# Patient Record
Sex: MALE | Race: WHITE | Employment: FULL TIME | URBAN - METROPOLITAN AREA
[De-identification: names, ages, dates, MRNs, and addresses within clinical notes are randomized per-mention and may not be internally consistent; named-entity substitution may affect disease eponyms.]

---

## 2020-10-21 ENCOUNTER — HOSPITAL ENCOUNTER (EMERGENCY)
Age: 50
Discharge: LEFT BEFORE DISCHARGE | End: 2020-10-21
Attending: FAMILY MEDICINE
Payer: COMMERCIAL

## 2020-10-21 ENCOUNTER — APPOINTMENT (OUTPATIENT)
Dept: CT IMAGING | Age: 50
End: 2020-10-21
Payer: COMMERCIAL

## 2020-10-21 ENCOUNTER — HOSPITAL ENCOUNTER (INPATIENT)
Age: 50
LOS: 5 days | Discharge: HOME | DRG: 385 | End: 2020-10-26
Attending: EMERGENCY MEDICINE | Admitting: SURGERY
Payer: COMMERCIAL

## 2020-10-21 VITALS
SYSTOLIC BLOOD PRESSURE: 120 MMHG | HEIGHT: 72 IN | HEART RATE: 80 BPM | DIASTOLIC BLOOD PRESSURE: 73 MMHG | RESPIRATION RATE: 18 BRPM | WEIGHT: 148 LBS | BODY MASS INDEX: 20.05 KG/M2 | OXYGEN SATURATION: 99 % | TEMPERATURE: 97.7 F

## 2020-10-21 PROBLEM — K65.1 INTRA-ABDOMINAL ABSCESS (HCC): Status: ACTIVE | Noted: 2020-10-21

## 2020-10-21 LAB
ALBUMIN SERPL-MCNC: 2.9 G/DL (ref 3.5–5.2)
ALBUMIN SERPL-MCNC: 3 G/DL (ref 3.5–5.2)
ALP BLD-CCNC: 75 U/L (ref 40–129)
ALP BLD-CCNC: 75 U/L (ref 40–129)
ALT SERPL-CCNC: 10 U/L (ref 0–40)
ALT SERPL-CCNC: 11 U/L (ref 0–40)
AMYLASE: 24 U/L (ref 20–100)
ANION GAP SERPL CALCULATED.3IONS-SCNC: 14 MMOL/L (ref 7–16)
ANION GAP SERPL CALCULATED.3IONS-SCNC: 9 MMOL/L (ref 7–16)
AST SERPL-CCNC: 12 U/L (ref 0–39)
AST SERPL-CCNC: 12 U/L (ref 0–39)
BACTERIA: ABNORMAL /HPF
BASOPHILS ABSOLUTE: 0.03 E9/L (ref 0–0.2)
BASOPHILS ABSOLUTE: 0.04 E9/L (ref 0–0.2)
BASOPHILS RELATIVE PERCENT: 0.3 % (ref 0–2)
BASOPHILS RELATIVE PERCENT: 0.4 % (ref 0–2)
BILIRUB SERPL-MCNC: 0.4 MG/DL (ref 0–1.2)
BILIRUB SERPL-MCNC: 0.5 MG/DL (ref 0–1.2)
BILIRUBIN URINE: NEGATIVE
BILIRUBIN URINE: NEGATIVE
BLOOD, URINE: NEGATIVE
BLOOD, URINE: NEGATIVE
BUN BLDV-MCNC: 12 MG/DL (ref 6–20)
BUN BLDV-MCNC: 8 MG/DL (ref 6–20)
CALCIUM SERPL-MCNC: 9 MG/DL (ref 8.6–10.2)
CALCIUM SERPL-MCNC: 9 MG/DL (ref 8.6–10.2)
CHLORIDE BLD-SCNC: 96 MMOL/L (ref 98–107)
CHLORIDE BLD-SCNC: 98 MMOL/L (ref 98–107)
CLARITY: CLEAR
CLARITY: CLEAR
CO2: 24 MMOL/L (ref 22–29)
CO2: 25 MMOL/L (ref 22–29)
COLOR: YELLOW
COLOR: YELLOW
CREAT SERPL-MCNC: 0.6 MG/DL (ref 0.7–1.2)
CREAT SERPL-MCNC: 0.8 MG/DL (ref 0.7–1.2)
EOSINOPHILS ABSOLUTE: 0.02 E9/L (ref 0.05–0.5)
EOSINOPHILS ABSOLUTE: 0.1 E9/L (ref 0.05–0.5)
EOSINOPHILS RELATIVE PERCENT: 0.2 % (ref 0–6)
EOSINOPHILS RELATIVE PERCENT: 1 % (ref 0–6)
GFR AFRICAN AMERICAN: >60
GFR AFRICAN AMERICAN: >60
GFR NON-AFRICAN AMERICAN: >60 ML/MIN/1.73
GFR NON-AFRICAN AMERICAN: >60 ML/MIN/1.73
GLUCOSE BLD-MCNC: 107 MG/DL (ref 74–99)
GLUCOSE BLD-MCNC: 121 MG/DL (ref 74–99)
GLUCOSE URINE: NEGATIVE MG/DL
GLUCOSE URINE: NEGATIVE MG/DL
HCT VFR BLD CALC: 28.8 % (ref 37–54)
HCT VFR BLD CALC: 29.5 % (ref 37–54)
HEMOGLOBIN: 9.2 G/DL (ref 12.5–16.5)
HEMOGLOBIN: 9.2 G/DL (ref 12.5–16.5)
IMMATURE GRANULOCYTES #: 0.05 E9/L
IMMATURE GRANULOCYTES #: 0.07 E9/L
IMMATURE GRANULOCYTES %: 0.5 % (ref 0–5)
IMMATURE GRANULOCYTES %: 0.6 % (ref 0–5)
KETONES, URINE: ABNORMAL MG/DL
KETONES, URINE: NEGATIVE MG/DL
LACTIC ACID: 1.7 MMOL/L (ref 0.5–2.2)
LACTIC ACID: 2 MMOL/L (ref 0.5–2.2)
LEUKOCYTE ESTERASE, URINE: NEGATIVE
LEUKOCYTE ESTERASE, URINE: NEGATIVE
LIPASE: 10 U/L (ref 13–60)
LIPASE: 11 U/L (ref 13–60)
LYMPHOCYTES ABSOLUTE: 0.92 E9/L (ref 1.5–4)
LYMPHOCYTES ABSOLUTE: 1.59 E9/L (ref 1.5–4)
LYMPHOCYTES RELATIVE PERCENT: 16.7 % (ref 20–42)
LYMPHOCYTES RELATIVE PERCENT: 8.2 % (ref 20–42)
MCH RBC QN AUTO: 26 PG (ref 26–35)
MCH RBC QN AUTO: 26.5 PG (ref 26–35)
MCHC RBC AUTO-ENTMCNC: 31.2 % (ref 32–34.5)
MCHC RBC AUTO-ENTMCNC: 31.9 % (ref 32–34.5)
MCV RBC AUTO: 83 FL (ref 80–99.9)
MCV RBC AUTO: 83.3 FL (ref 80–99.9)
MONOCYTES ABSOLUTE: 0.47 E9/L (ref 0.1–0.95)
MONOCYTES ABSOLUTE: 0.63 E9/L (ref 0.1–0.95)
MONOCYTES RELATIVE PERCENT: 4.2 % (ref 2–12)
MONOCYTES RELATIVE PERCENT: 6.6 % (ref 2–12)
NEUTROPHILS ABSOLUTE: 7.13 E9/L (ref 1.8–7.3)
NEUTROPHILS ABSOLUTE: 9.64 E9/L (ref 1.8–7.3)
NEUTROPHILS RELATIVE PERCENT: 74.9 % (ref 43–80)
NEUTROPHILS RELATIVE PERCENT: 86.4 % (ref 43–80)
NITRITE, URINE: NEGATIVE
NITRITE, URINE: NEGATIVE
PDW BLD-RTO: 17.9 FL (ref 11.5–15)
PDW BLD-RTO: 17.9 FL (ref 11.5–15)
PH UA: 5.5 (ref 5–9)
PH UA: 6 (ref 5–9)
PLATELET # BLD: 664 E9/L (ref 130–450)
PLATELET # BLD: 666 E9/L (ref 130–450)
PMV BLD AUTO: 9.1 FL (ref 7–12)
PMV BLD AUTO: 9.1 FL (ref 7–12)
POTASSIUM REFLEX MAGNESIUM: 4.3 MMOL/L (ref 3.5–5)
POTASSIUM SERPL-SCNC: 4.3 MMOL/L (ref 3.5–5)
PROTEIN UA: NEGATIVE MG/DL
PROTEIN UA: NEGATIVE MG/DL
RBC # BLD: 3.47 E12/L (ref 3.8–5.8)
RBC # BLD: 3.54 E12/L (ref 3.8–5.8)
RBC UA: ABNORMAL /HPF (ref 0–2)
SODIUM BLD-SCNC: 132 MMOL/L (ref 132–146)
SODIUM BLD-SCNC: 134 MMOL/L (ref 132–146)
SPECIFIC GRAVITY UA: 1.01 (ref 1–1.03)
SPECIFIC GRAVITY UA: 1.01 (ref 1–1.03)
TOTAL PROTEIN: 6.6 G/DL (ref 6.4–8.3)
TOTAL PROTEIN: 7 G/DL (ref 6.4–8.3)
TSH SERPL DL<=0.05 MIU/L-ACNC: 1.6 UIU/ML (ref 0.27–4.2)
UROBILINOGEN, URINE: 2 E.U./DL
UROBILINOGEN, URINE: 2 E.U./DL
WBC # BLD: 11.2 E9/L (ref 4.5–11.5)
WBC # BLD: 9.5 E9/L (ref 4.5–11.5)
WBC UA: ABNORMAL /HPF (ref 0–5)

## 2020-10-21 PROCEDURE — 74177 CT ABD & PELVIS W/CONTRAST: CPT

## 2020-10-21 PROCEDURE — 96365 THER/PROPH/DIAG IV INF INIT: CPT

## 2020-10-21 PROCEDURE — 85025 COMPLETE CBC W/AUTO DIFF WBC: CPT

## 2020-10-21 PROCEDURE — 81001 URINALYSIS AUTO W/SCOPE: CPT

## 2020-10-21 PROCEDURE — 1200000000 HC SEMI PRIVATE

## 2020-10-21 PROCEDURE — 2580000003 HC RX 258: Performed by: RADIOLOGY

## 2020-10-21 PROCEDURE — 82150 ASSAY OF AMYLASE: CPT

## 2020-10-21 PROCEDURE — 83690 ASSAY OF LIPASE: CPT

## 2020-10-21 PROCEDURE — 6360000004 HC RX CONTRAST MEDICATION: Performed by: RADIOLOGY

## 2020-10-21 PROCEDURE — 99282 EMERGENCY DEPT VISIT SF MDM: CPT

## 2020-10-21 PROCEDURE — 84443 ASSAY THYROID STIM HORMONE: CPT

## 2020-10-21 PROCEDURE — 99284 EMERGENCY DEPT VISIT MOD MDM: CPT

## 2020-10-21 PROCEDURE — 83605 ASSAY OF LACTIC ACID: CPT

## 2020-10-21 PROCEDURE — 80053 COMPREHEN METABOLIC PANEL: CPT

## 2020-10-21 PROCEDURE — 81003 URINALYSIS AUTO W/O SCOPE: CPT

## 2020-10-21 PROCEDURE — 6360000002 HC RX W HCPCS: Performed by: FAMILY MEDICINE

## 2020-10-21 PROCEDURE — 2580000003 HC RX 258: Performed by: SURGERY

## 2020-10-21 PROCEDURE — 36415 COLL VENOUS BLD VENIPUNCTURE: CPT

## 2020-10-21 PROCEDURE — 2580000003 HC RX 258: Performed by: FAMILY MEDICINE

## 2020-10-21 RX ORDER — BUPRENORPHINE AND NALOXONE 8; 2 MG/1; MG/1
1 FILM, SOLUBLE BUCCAL; SUBLINGUAL DAILY
COMMUNITY

## 2020-10-21 RX ORDER — SODIUM CHLORIDE 0.9 % (FLUSH) 0.9 %
10 SYRINGE (ML) INJECTION PRN
Status: COMPLETED | OUTPATIENT
Start: 2020-10-21 | End: 2020-10-21

## 2020-10-21 RX ORDER — 0.9 % SODIUM CHLORIDE 0.9 %
1000 INTRAVENOUS SOLUTION INTRAVENOUS ONCE
Status: COMPLETED | OUTPATIENT
Start: 2020-10-21 | End: 2020-10-21

## 2020-10-21 RX ORDER — MAGNESIUM SULFATE IN WATER 40 MG/ML
2 INJECTION, SOLUTION INTRAVENOUS PRN
Status: DISCONTINUED | OUTPATIENT
Start: 2020-10-21 | End: 2020-10-26 | Stop reason: HOSPADM

## 2020-10-21 RX ORDER — ACETAMINOPHEN 325 MG/1
650 TABLET ORAL EVERY 6 HOURS PRN
Status: DISCONTINUED | OUTPATIENT
Start: 2020-10-21 | End: 2020-10-26 | Stop reason: HOSPADM

## 2020-10-21 RX ORDER — ONDANSETRON 2 MG/ML
4 INJECTION INTRAMUSCULAR; INTRAVENOUS EVERY 6 HOURS PRN
Status: DISCONTINUED | OUTPATIENT
Start: 2020-10-21 | End: 2020-10-26 | Stop reason: HOSPADM

## 2020-10-21 RX ORDER — PIPERACILLIN SODIUM, TAZOBACTAM SODIUM 3; .375 G/15ML; G/15ML
INJECTION, POWDER, LYOPHILIZED, FOR SOLUTION INTRAVENOUS
Status: DISCONTINUED
Start: 2020-10-21 | End: 2020-10-21 | Stop reason: HOSPADM

## 2020-10-21 RX ORDER — BUPRENORPHINE AND NALOXONE 8; 2 MG/1; MG/1
1 FILM, SOLUBLE BUCCAL; SUBLINGUAL DAILY
Status: DISCONTINUED | OUTPATIENT
Start: 2020-10-22 | End: 2020-10-22

## 2020-10-21 RX ORDER — POTASSIUM CHLORIDE 20 MEQ/1
40 TABLET, EXTENDED RELEASE ORAL PRN
Status: DISCONTINUED | OUTPATIENT
Start: 2020-10-21 | End: 2020-10-26 | Stop reason: HOSPADM

## 2020-10-21 RX ORDER — SODIUM CHLORIDE 0.9 % (FLUSH) 0.9 %
10 SYRINGE (ML) INJECTION EVERY 12 HOURS SCHEDULED
Status: DISCONTINUED | OUTPATIENT
Start: 2020-10-21 | End: 2020-10-26 | Stop reason: HOSPADM

## 2020-10-21 RX ORDER — POTASSIUM CHLORIDE 7.45 MG/ML
10 INJECTION INTRAVENOUS PRN
Status: DISCONTINUED | OUTPATIENT
Start: 2020-10-21 | End: 2020-10-26 | Stop reason: HOSPADM

## 2020-10-21 RX ORDER — ACETAMINOPHEN 650 MG/1
650 SUPPOSITORY RECTAL EVERY 6 HOURS PRN
Status: DISCONTINUED | OUTPATIENT
Start: 2020-10-21 | End: 2020-10-26 | Stop reason: HOSPADM

## 2020-10-21 RX ORDER — SODIUM CHLORIDE 0.9 % (FLUSH) 0.9 %
10 SYRINGE (ML) INJECTION PRN
Status: DISCONTINUED | OUTPATIENT
Start: 2020-10-21 | End: 2020-10-26 | Stop reason: HOSPADM

## 2020-10-21 RX ORDER — PROMETHAZINE HYDROCHLORIDE 25 MG/1
12.5 TABLET ORAL EVERY 6 HOURS PRN
Status: DISCONTINUED | OUTPATIENT
Start: 2020-10-21 | End: 2020-10-26 | Stop reason: HOSPADM

## 2020-10-21 RX ADMIN — Medication 10 ML: at 10:06

## 2020-10-21 RX ADMIN — IOPAMIDOL 75 ML: 755 INJECTION, SOLUTION INTRAVENOUS at 10:06

## 2020-10-21 RX ADMIN — PIPERACILLIN SODIUM AND TAZOBACTAM SODIUM 3.38 G: 3; .375 INJECTION, POWDER, LYOPHILIZED, FOR SOLUTION INTRAVENOUS at 12:48

## 2020-10-21 RX ADMIN — Medication 10 ML: at 23:14

## 2020-10-21 RX ADMIN — SODIUM CHLORIDE 1000 ML: 9 INJECTION, SOLUTION INTRAVENOUS at 09:08

## 2020-10-21 ASSESSMENT — ENCOUNTER SYMPTOMS
SHORTNESS OF BREATH: 0
COUGH: 0
BACK PAIN: 0
ABDOMINAL PAIN: 1

## 2020-10-21 ASSESSMENT — PAIN DESCRIPTION - LOCATION: LOCATION: ABDOMEN

## 2020-10-21 ASSESSMENT — PAIN SCALES - GENERAL
PAINLEVEL_OUTOF10: 0

## 2020-10-21 NOTE — ED NOTES
Dr Javed Hinton in to discuss admission with patient. Per patient \"I will drive myself there. I did not plan on being admitted, so I need to run home and get my suitcase. But I will go to [facility] when I am finished. \" patient agreeable to sign AMA paperwork     Yelena Armijo RN  10/21/20 1504

## 2020-10-21 NOTE — ED NOTES
Nurse at bedside to collect blood cultures. Per patient \"I do not want to get poked a million times. I do not need these done. \" blood cultures not obtained at this time. Dr Kolby Leos notified.      Denisa Coronel RN  10/21/20 0048

## 2020-10-21 NOTE — ED PROVIDER NOTES
This is a 82-year-old male past medical history of Crohn's colitis who presents to the ED for evaluation of abdominal pain. Patient states over the past 1 to 2 days been having increasing diffuse abdominal pain. Denies any mitigating or exacerbating factors. Dates that the pain is mild in nature. He states that he has been having no black or bloody stools. Dates he does have some associated nausea but denies any vomiting. She denies any chest pain or shortness of breath. Patient was seen earlier today at a sister facility of ours at [facility] emergency room at that time the patient was found to have an intra-abdominal abscess. Patient stated that since he felt better he wanted to go home and let his dog out make sure his dog was taken care of before he came back for admission. The history is provided by the patient. No  was used. Review of Systems   Constitutional: Negative for fever. HENT: Negative for congestion. Eyes: Negative for visual disturbance. Respiratory: Negative for cough and shortness of breath. Cardiovascular: Negative for chest pain. Gastrointestinal: Positive for abdominal pain. Endocrine: Negative for polyuria. Genitourinary: Negative for dysuria. Musculoskeletal: Negative for back pain. Skin: Negative for rash. Allergic/Immunologic: Negative for immunocompromised state. Neurological: Negative for headaches. Hematological: Does not bruise/bleed easily. Psychiatric/Behavioral: Negative for confusion. Physical Exam  Vitals signs and nursing note reviewed. Constitutional:       General: He is not in acute distress. Appearance: He is well-developed. HENT:      Head: Normocephalic and atraumatic. Mouth/Throat:      Mouth: Mucous membranes are dry. Eyes:      Extraocular Movements: Extraocular movements intact. Neck:      Musculoskeletal: Normal range of motion and neck supple. Vascular: No JVD. Cardiovascular:      Rate and Rhythm: Normal rate and regular rhythm. Heart sounds: No murmur. Pulmonary:      Effort: Pulmonary effort is normal.      Breath sounds: No wheezing, rhonchi or rales. Chest:      Chest wall: No tenderness. Abdominal:      General: There is no distension. Palpations: Abdomen is soft. Tenderness: There is no right CVA tenderness, left CVA tenderness, guarding or rebound. Hernia: No hernia is present. Comments: Mild diffuse tenderness to palpation   Musculoskeletal:      Right lower leg: No edema. Left lower leg: No edema. Skin:     General: Skin is warm and dry. Capillary Refill: Capillary refill takes less than 2 seconds. Neurological:      General: No focal deficit present. Mental Status: He is alert and oriented to person, place, and time. Cranial Nerves: No cranial nerve deficit. Psychiatric:         Mood and Affect: Mood normal.         Behavior: Behavior normal.          Procedures     MDM  Number of Diagnoses or Management Options  Intra-abdominal abscess [facility]):   Diagnosis management comments: This is a 68-year-old male who presented to the ED after signing out [address] and one of her sister facilities. Was complained of some vague abdominal pain for the past several days and imaging showed a likely intra-abdominal abscess with some free air was unsure of is coming from perhaps bacteria or a perforation. Patient was in no distress had no abdominal tenderness on examination or peritoneal signs therefore did not feel that this was an acute abdomen with perforation. She was already treated with antibiotics at her sister facility and given this I discussed the case with Dr. Alka Arellano who stated that she would admit the patient for further evaluation.   She was agreeable with this plan.                     --------------------------------------------- PAST HISTORY ---------------------------------------------  Past Medical History:  has a past medical history of Crohn's disease ([address] and PE (pulmonary embolism). Past Surgical History:  has a past surgical history that includes Vasectomy; [place] tooth extraction; and knee surgery (Right). Social History:  reports that he has been smoking cigarettes. He has been smoking about 1.00 pack per day. He has never used smokeless tobacco. He reports previous drug use. He reports that he does not drink alcohol. Family History: family history is not on file. The patients home medications have been reviewed. Allergies: Patient has no known allergies.     -------------------------------------------------- RESULTS -------------------------------------------------    LABS:  Results for orders placed or performed during the hospital encounter of 10/21/20   Comprehensive Metabolic Panel   Result Value Ref Range    Sodium 132 132 - 146 mmol/L    Potassium 4.3 3.5 - 5.0 mmol/L    Chloride 98 98 - 107 mmol/L    CO2 25 22 - 29 mmol/L    Anion Gap 9 7 - 16 mmol/L    Glucose 107 (H) 74 - 99 mg/dL    BUN 8 6 - 20 mg/dL    CREATININE 0.6 (L) 0.7 - 1.2 mg/dL    GFR Non-African American >60 >=60 mL/min/1.73    GFR African American >60     Calcium 9.0 8.6 - 10.2 mg/dL    Total Protein 6.6 6.4 - 8.3 g/dL    Alb 2.9 (L) 3.5 - 5.2 g/dL    Total Bilirubin 0.4 0.0 - 1.2 mg/dL    Alkaline Phosphatase 75 40 - 129 U/L    ALT 10 0 - 40 U/L    AST 12 0 - 39 U/L   CBC auto differential   Result Value Ref Range    WBC 9.5 4.5 - 11.5 E9/L    RBC 3.54 (L) 3.80 - 5.80 E12/L    Hemoglobin 9.2 (L) 12.5 - 16.5 g/dL    Hematocrit 29.5 (L) 37.0 - 54.0 %    MCV 83.3 80.0 - 99.9 fL    MCH 26.0 26.0 - 35.0 pg    MCHC 31.2 (L) 32.0 - 34.5 %    RDW 17.9 (H) 11.5 - 15.0 fL    Platelets 288 (H) 634 - 450 E9/L    MPV 9.1 7.0 - 12.0 fL    Neutrophils % 74.9 43.0 - 80.0 %    Immature Granulocytes % 0.5 0.0 - 5.0 %    Lymphocytes % 16.7 (L) 20.0 - 42.0 %    Monocytes % 6.6 2.0 - 12.0 %    Eosinophils % 1.0 0.0 - 6.0 %    Basophils % 0.3 0.0 - 2.0 %    Neutrophils Absolute 7.13 1.80 - 7.30 E9/L    Immature Granulocytes # 0.05 E9/L    Lymphocytes Absolute 1.59 1.50 - 4.00 E9/L    Monocytes Absolute 0.63 0.10 - 0.95 E9/L    Eosinophils Absolute 0.10 0.05 - 0.50 E9/L    Basophils Absolute 0.03 0.00 - 0.20 E9/L   Lipase   Result Value Ref Range    Lipase 10 (L) 13 - 60 U/L   Lactic Acid, Plasma   Result Value Ref Range    Lactic Acid 1.7 0.5 - 2.2 mmol/L   Urinalysis with Microscopic   Result Value Ref Range    Color, UA Yellow Straw/Yellow    Clarity, UA Clear Clear    Glucose, Ur Negative Negative mg/dL    Bilirubin Urine Negative Negative    Ketones, Urine TRACE (A) Negative mg/dL    Specific Gravity, UA 1.015 1.005 - 1.030    Blood, Urine Negative Negative    pH, UA 5.5 5.0 - 9.0    Protein, UA Negative Negative mg/dL    Urobilinogen, Urine 2.0 (A) <2.0 E.U./dL    Nitrite, Urine Negative Negative    Leukocyte Esterase, Urine Negative Negative    WBC, UA NONE 0 - 5 /HPF    RBC, UA NONE 0 - 2 /HPF    Bacteria, UA NONE SEEN None Seen /HPF       RADIOLOGY:  No orders to display           ------------------------- NURSING NOTES AND VITALS REVIEWED ---------------------------  Date / Time Roomed:  10/21/2020  6:34 PM  ED Bed Assignment:  [id number]    The nursing notes within the ED encounter and vital signs as below have been reviewed.      Patient Vitals for the past 24 hrs:   BP Temp Temp src Pulse Resp SpO2 Height Weight   10/21/20 2200 132/84 98.4 °F (36.9 °C) Oral 87 16 96 % 6' 2\" (1.88 m) 150 lb (68 kg)   10/21/20 2110 131/77 99.6 °F (37.6 °C) Oral 91 16 98 % -- --   10/21/20 2015 (!) 144/86 98.6 °F (37 °C) Oral 95 16 100 % 6' 2\" (1.88 m) 151 lb 4.8 oz (68.6 kg)   10/21/20 1555 117/79 98.4 °F (36.9 °C) Oral 104 16 98 % 6' (1.829 m) --       Oxygen Saturation Interpretation: Normal    ------------------------------------------ PROGRESS NOTES ------------------------------------------  Re-evaluation(s):  Time: 1775  Patients symptoms show no change  Repeat physical examination is not changed    Counseling:  I have spoken with the patient and discussed todays results, in addition to providing specific details for the plan of care and counseling regarding the diagnosis and prognosis. Their questions are answered at this time and they are agreeable with the plan of admission.    --------------------------------- ADDITIONAL PROVIDER NOTES ---------------------------------  Consultations:   Spoke with Dr. Micaela Chavira  Discussed case. They will admit the patient. This patient's ED course included: a personal history and physicial examination, re-evaluation prior to disposition and complex medical decision making and emergency management    This patient has remained hemodynamically stable during their ED course. Diagnosis:  1. Intra-abdominal abscess ([facility] [address]        Disposition:  Patient's disposition: Admit to med/surg floor  Patient's condition is stable.        Rajesh Singh DO  10/22/20 7062

## 2020-10-21 NOTE — ED TRIAGE NOTES
FIRST PROVIDER CONTACT ASSESSMENT NOTE      Department of Emergency Medicine   10/21/20  3:55 PM EDT    Chief Complaint: Other (was seen at [facility] told has an abscess on intestines and needed admitted)      History of Present Illness:   Cheko Hood is a 52 y.o. male who presents to the ED for an abscess in the intestines. He states he was seen at [facility] emergency department and [facility] [address] He states he had to go home and take care of his kids and get things straightened out before coming for admission. He states now he is able to be admitted. He denies any shortness breath or chest pain. Patient was given 1 dose of Zosyn at [facility] emergency department before signing out [facility]. Medical History:  has a past medical history of Crohn's disease ([address] and PE (pulmonary embolism). Surgical History:  has a past surgical history that includes Vasectomy; [place] tooth extraction; and knee surgery (Right). Social History:  reports that he has been smoking cigarettes. He has been smoking about 1.00 pack per day. He does not have any smokeless tobacco history on file. He reports that he does not drink alcohol or use drugs. Family History: family history is not on file. *ALLERGIES*     Patient has no known allergies.      Physical Exam:      VS:  /79   Pulse 104   Temp 98.4 °F (36.9 °C) (Oral)   Resp 16   Ht 6' (1.829 m)   SpO2 98%   BMI 20.07 kg/m²      Initial Plan of Care:  Initiate Treatment-Testing, Proceed toTreatment Area When Bed Available for ED Attending/MLP to Continue Care    -----------------END OF FIRST PROVIDER CONTACT ASSESSMENT NOTE--------------  Electronically signed by HERSON Wells CNP   DD: 10/21/20

## 2020-10-21 NOTE — ED PROVIDER NOTES
HPI:  10/21/20,   Time: 8:22 AM EDT         Tone Pandey is a 52 y.o. male presenting to the ED for 25 pound weight loss over the past 2 months, unintended. His other main complaint is that he has to force urination, he states that there is decreased stream when he goes more often than normal has not had normal urinary pattern. He denies dysuria. He denies symptoms such as chest pain or palpitations or shortness of breath. He denies nausea or vomiting or diarrhea. He does have a history of \"Crohn's disease\" versus irritable bowel syndrome, he does not think it that they have had a clear-cut diagnosis of Crohn's disease. He denies abdominal pain. ROS:   Pertinent positives and negatives are stated within HPI, all other systems reviewed and are negative.  --------------------------------------------- PAST HISTORY ---------------------------------------------  Past Medical History:  has a past medical history of Crohn's disease ([address] and PE (pulmonary embolism). Past Surgical History:  has a past surgical history that includes Vasectomy; [place] tooth extraction; and knee surgery (Right). Social History:  reports that he has been smoking cigarettes. He has been smoking about 1.00 pack per day. He has never used smokeless tobacco. He reports previous drug use. He reports that he does not drink alcohol. Family History: family history is not on file. The patients home medications have been reviewed. Allergies: Patient has no known allergies.     -------------------------------------------------- RESULTS -------------------------------------------------  All laboratory and radiology results have been personally reviewed by myself   LABS:  Results for orders placed or performed during the hospital encounter of 10/21/20   CBC Auto Differential   Result Value Ref Range    WBC 11.2 4.5 - 11.5 E9/L    RBC 3.47 (L) 3.80 - 5.80 E12/L    Hemoglobin 9.2 (L) 12.5 - 16.5 g/dL    Hematocrit 28.8 (L) 37.0 - 54.0 %    MCV 83.0 80.0 - 99.9 fL    MCH 26.5 26.0 - 35.0 pg    MCHC 31.9 (L) 32.0 - 34.5 %    RDW 17.9 (H) 11.5 - 15.0 fL    Platelets 771 (H) 571 - 450 E9/L    MPV 9.1 7.0 - 12.0 fL    Neutrophils % 86.4 (H) 43.0 - 80.0 %    Immature Granulocytes % 0.6 0.0 - 5.0 %    Lymphocytes % 8.2 (L) 20.0 - 42.0 %    Monocytes % 4.2 2.0 - 12.0 %    Eosinophils % 0.2 0.0 - 6.0 %    Basophils % 0.4 0.0 - 2.0 %    Neutrophils Absolute 9.64 (H) 1.80 - 7.30 E9/L    Immature Granulocytes # 0.07 E9/L    Lymphocytes Absolute 0.92 (L) 1.50 - 4.00 E9/L    Monocytes Absolute 0.47 0.10 - 0.95 E9/L    Eosinophils Absolute 0.02 (L) 0.05 - 0.50 E9/L    Basophils Absolute 0.04 0.00 - 0.20 E9/L   Comprehensive Metabolic Panel w/ Reflex to MG   Result Value Ref Range    Sodium 134 132 - 146 mmol/L    Potassium reflex Magnesium 4.3 3.5 - 5.0 mmol/L    Chloride 96 (L) 98 - 107 mmol/L    CO2 24 22 - 29 mmol/L    Anion Gap 14 7 - 16 mmol/L    Glucose 121 (H) 74 - 99 mg/dL    BUN 12 6 - 20 mg/dL    CREATININE 0.8 0.7 - 1.2 mg/dL    GFR Non-African American >60 >=60 mL/min/1.73    GFR African American >60     Calcium 9.0 8.6 - 10.2 mg/dL    Total Protein 7.0 6.4 - 8.3 g/dL    Alb 3.0 (L) 3.5 - 5.2 g/dL    Total Bilirubin 0.5 0.0 - 1.2 mg/dL    Alkaline Phosphatase 75 40 - 129 U/L    ALT 11 0 - 40 U/L    AST 12 0 - 39 U/L   Lipase   Result Value Ref Range    Lipase 11 (L) 13 - 60 U/L   Amylase   Result Value Ref Range    Amylase 24 20 - 100 U/L   Urinalysis, reflex to microscopic   Result Value Ref Range    Color, UA Yellow Straw/Yellow    Clarity, UA Clear Clear    Glucose, Ur Negative Negative mg/dL    Bilirubin Urine Negative Negative    Ketones, Urine Negative Negative mg/dL    Specific Gravity, UA 1.010 1.005 - 1.030    Blood, Urine Negative Negative    pH, UA 6.0 5.0 - 9.0    Protein, UA Negative Negative mg/dL    Urobilinogen, Urine 2.0 (A) <2.0 E.U./dL    Nitrite, Urine Negative Negative    Leukocyte Esterase, Urine Negative pulses  Abdomen: Soft, non tender, non distended,   Extremities: Moves all extremities x 4. Warm and well perfused  Skin: warm and dry without rash  Neurologic: GCS 15,  Psych: Normal Affect      ------------------------------ ED COURSE/MEDICAL DECISION MAKING----------------------  Medications   0.9 % sodium chloride bolus (0 mLs Intravenous Stopped 10/21/20 1128)   iopamidol (ISOVUE-370) 76 % injection 75 mL (75 mLs Intravenous Given 10/21/20 1006)   sodium chloride flush 0.9 % injection 10 mL (10 mLs Intravenous Given 10/21/20 1006)   piperacillin-tazobactam (ZOSYN) 3.375 g in dextrose 5 % 100 mL IVPB (mini-bag) (0 g Intravenous Stopped 10/21/20 1334)         Medical Decision Making: The differential diagnosis certainly would involve an oncological etiology for the patient's weight loss. Lab testing and CT scan of the abdomen pelvis will be ordered, he does have abdominal tenderness. Results of the CT scan show an intraabdominal abscess. The patient's white count is normal.  I discussed and emphasized the importance of a hospital admission. The patient is adamant that he wants to go home, to take care of some matters before he is admitted to the hospital.  He was given notice that if he leaves the this emergency department, he will have to reregister and another facility and arrangements will have to be made through the another emergency department for admission and consultation with general surgery. He was aware that it would be necessary for him to reregister. Is also aware and made known to him that his condition could worsen, and complications would include a bowel perforation, sepsis, and even death. With this in mind, he made decision to leave the emergency [address] Counseling:    The emergency provider has spoken with the patient and discussed todays results, in addition to providing specific details for the plan of care and counseling regarding the diagnosis and prognosis. Questions are answered at this time and they are agreeable with the plan.      --------------------------------- IMPRESSION AND DISPOSITION ---------------------------------    IMPRESSION  1.  Intra-abdominal abscess ([facility] [address]        DISPOSITION  Disposition: Other Disposition: Left AMA  Patient condition is fair                 Nikolai Santana MD  10/22/20 1004

## 2020-10-22 ENCOUNTER — APPOINTMENT (OUTPATIENT)
Dept: CT IMAGING | Age: 50
DRG: 385 | End: 2020-10-22
Payer: COMMERCIAL

## 2020-10-22 LAB
ANION GAP SERPL CALCULATED.3IONS-SCNC: 9 MMOL/L (ref 7–16)
BASOPHILS ABSOLUTE: 0.05 E9/L (ref 0–0.2)
BASOPHILS RELATIVE PERCENT: 0.7 % (ref 0–2)
BUN BLDV-MCNC: 7 MG/DL (ref 6–20)
CALCIUM SERPL-MCNC: 9 MG/DL (ref 8.6–10.2)
CHLORIDE BLD-SCNC: 100 MMOL/L (ref 98–107)
CO2: 26 MMOL/L (ref 22–29)
CREAT SERPL-MCNC: 0.7 MG/DL (ref 0.7–1.2)
EOSINOPHILS ABSOLUTE: 0.15 E9/L (ref 0.05–0.5)
EOSINOPHILS RELATIVE PERCENT: 2.2 % (ref 0–6)
GFR AFRICAN AMERICAN: >60
GFR NON-AFRICAN AMERICAN: >60 ML/MIN/1.73
GLUCOSE BLD-MCNC: 100 MG/DL (ref 74–99)
HCT VFR BLD CALC: 31 % (ref 37–54)
HEMOGLOBIN: 9.6 G/DL (ref 12.5–16.5)
IMMATURE GRANULOCYTES #: 0.03 E9/L
IMMATURE GRANULOCYTES %: 0.4 % (ref 0–5)
INR BLD: 1.2
LYMPHOCYTES ABSOLUTE: 1.79 E9/L (ref 1.5–4)
LYMPHOCYTES RELATIVE PERCENT: 26.4 % (ref 20–42)
MCH RBC QN AUTO: 25.8 PG (ref 26–35)
MCHC RBC AUTO-ENTMCNC: 31 % (ref 32–34.5)
MCV RBC AUTO: 83.3 FL (ref 80–99.9)
MONOCYTES ABSOLUTE: 0.36 E9/L (ref 0.1–0.95)
MONOCYTES RELATIVE PERCENT: 5.3 % (ref 2–12)
NEUTROPHILS ABSOLUTE: 4.4 E9/L (ref 1.8–7.3)
NEUTROPHILS RELATIVE PERCENT: 65 % (ref 43–80)
PDW BLD-RTO: 17.8 FL (ref 11.5–15)
PLATELET # BLD: 679 E9/L (ref 130–450)
PMV BLD AUTO: 9.1 FL (ref 7–12)
POTASSIUM REFLEX MAGNESIUM: 4.4 MMOL/L (ref 3.5–5)
PROTHROMBIN TIME: 14 SEC (ref 9.3–12.4)
RBC # BLD: 3.72 E12/L (ref 3.8–5.8)
SODIUM BLD-SCNC: 135 MMOL/L (ref 132–146)
WBC # BLD: 6.8 E9/L (ref 4.5–11.5)

## 2020-10-22 PROCEDURE — 2580000003 HC RX 258: Performed by: RADIOLOGY

## 2020-10-22 PROCEDURE — 6360000004 HC RX CONTRAST MEDICATION: Performed by: RADIOLOGY

## 2020-10-22 PROCEDURE — 85025 COMPLETE CBC W/AUTO DIFF WBC: CPT

## 2020-10-22 PROCEDURE — 6360000002 HC RX W HCPCS: Performed by: STUDENT IN AN ORGANIZED HEALTH CARE EDUCATION/TRAINING PROGRAM

## 2020-10-22 PROCEDURE — 1200000000 HC SEMI PRIVATE

## 2020-10-22 PROCEDURE — 74177 CT ABD & PELVIS W/CONTRAST: CPT

## 2020-10-22 PROCEDURE — 36415 COLL VENOUS BLD VENIPUNCTURE: CPT

## 2020-10-22 PROCEDURE — 85610 PROTHROMBIN TIME: CPT

## 2020-10-22 PROCEDURE — 2580000003 HC RX 258: Performed by: SURGERY

## 2020-10-22 PROCEDURE — 80048 BASIC METABOLIC PNL TOTAL CA: CPT

## 2020-10-22 PROCEDURE — 2580000003 HC RX 258: Performed by: STUDENT IN AN ORGANIZED HEALTH CARE EDUCATION/TRAINING PROGRAM

## 2020-10-22 PROCEDURE — 99223 1ST HOSP IP/OBS HIGH 75: CPT | Performed by: SURGERY

## 2020-10-22 RX ORDER — SODIUM CHLORIDE 0.9 % (FLUSH) 0.9 %
10 SYRINGE (ML) INJECTION 2 TIMES DAILY
Status: DISCONTINUED | OUTPATIENT
Start: 2020-10-22 | End: 2020-10-26 | Stop reason: HOSPADM

## 2020-10-22 RX ORDER — BUPRENORPHINE AND NALOXONE 8; 2 MG/1; MG/1
0.5 FILM, SOLUBLE BUCCAL; SUBLINGUAL 2 TIMES DAILY
Status: DISCONTINUED | OUTPATIENT
Start: 2020-10-22 | End: 2020-10-26 | Stop reason: HOSPADM

## 2020-10-22 RX ORDER — SODIUM CHLORIDE 9 MG/ML
INJECTION, SOLUTION INTRAVENOUS EVERY 8 HOURS
Status: DISCONTINUED | OUTPATIENT
Start: 2020-10-22 | End: 2020-10-23

## 2020-10-22 RX ADMIN — PIPERACILLIN SODIUM AND TAZOBACTAM SODIUM 3.38 G: 3; .375 INJECTION, POWDER, LYOPHILIZED, FOR SOLUTION INTRAVENOUS at 09:01

## 2020-10-22 RX ADMIN — BUPRENORPHINE AND NALOXONE 0.5 FILM: 8; 2 FILM, SOLUBLE BUCCAL; SUBLINGUAL at 21:05

## 2020-10-22 RX ADMIN — BUPRENORPHINE AND NALOXONE 0.5 FILM: 8; 2 FILM, SOLUBLE BUCCAL; SUBLINGUAL at 14:36

## 2020-10-22 RX ADMIN — IOHEXOL 50 ML: 240 INJECTION, SOLUTION INTRATHECAL; INTRAVASCULAR; INTRAVENOUS; ORAL at 11:09

## 2020-10-22 RX ADMIN — SODIUM CHLORIDE, PRESERVATIVE FREE 10 ML: 5 INJECTION INTRAVENOUS at 14:36

## 2020-10-22 RX ADMIN — SODIUM CHLORIDE: 9 INJECTION, SOLUTION INTRAVENOUS at 21:05

## 2020-10-22 RX ADMIN — PIPERACILLIN SODIUM AND TAZOBACTAM SODIUM 3.38 G: 3; .375 INJECTION, POWDER, LYOPHILIZED, FOR SOLUTION INTRAVENOUS at 17:27

## 2020-10-22 RX ADMIN — PIPERACILLIN SODIUM AND TAZOBACTAM SODIUM 3.38 G: 3; .375 INJECTION, POWDER, LYOPHILIZED, FOR SOLUTION INTRAVENOUS at 23:57

## 2020-10-22 RX ADMIN — SODIUM CHLORIDE: 9 INJECTION, SOLUTION INTRAVENOUS at 11:45

## 2020-10-22 RX ADMIN — Medication 10 ML: at 09:01

## 2020-10-22 RX ADMIN — IOPAMIDOL 75 ML: 755 INJECTION, SOLUTION INTRAVENOUS at 11:10

## 2020-10-22 ASSESSMENT — PAIN SCALES - GENERAL
PAINLEVEL_OUTOF10: 0

## 2020-10-22 NOTE — PATIENT CARE CONFERENCE
[facility] Quality Flow/Interdisciplinary Rounds Progress Note        Quality Flow Rounds held on October 22, 2020    Disciplines Attending:  Bedside Nurse, ,  and Nursing Unit Leadership    Luis Antonio Banuelos was admitted on 10/21/2020  6:34 PM    Anticipated Discharge Date:  Expected Discharge Date: 10/25/20    Disposition:    Eliud Score:  Eliud Scale Score: 23    Readmission Risk              Risk of Unplanned Readmission:        7           Discussed patient goal for the day, patient clinical progression, and barriers to discharge.   The following Goal(s) of the Day/Commitment(s) have been identified:  Diagnostics - Report Results      Diana Reese  October 22, 2020

## 2020-10-22 NOTE — PLAN OF CARE
Shift  Goal: Ability to identify factors that increase the pain will improve  Description: Ability to identify factors that increase the pain will improve  Outcome: Met This Shift  Goal: Ability to notify healthcare provider of pain before it becomes unmanageable or unbearable will improve  Description: Ability to notify healthcare provider of pain before it becomes unmanageable or unbearable will improve  Outcome: Met This Shift

## 2020-10-22 NOTE — PLAN OF CARE
Problem: Pain:  Goal: Pain level will decrease  Description: Pain level will decrease  10/22/2020 1425 by Miki Obrien RN  Outcome: Met This Shift  10/22/2020 0108 by Lauren Perdue RN  Outcome: Met This Shift  Goal: Control of acute pain  Description: Control of acute pain  10/22/2020 1425 by Miki Obrien RN  Outcome: Met This Shift  10/22/2020 0108 by Lauren Perdue RN  Outcome: Met This Shift  Goal: Control of chronic pain  Description: Control of chronic pain  10/22/2020 1425 by Miki Obrien RN  Outcome: Met This Shift  10/22/2020 0108 by Lauren Perdue RN  Outcome: Met This Shift     Problem: Activity:  Goal: Risk for activity intolerance will decrease  Description: Risk for activity intolerance will decrease  10/22/2020 1425 by Miki Obrien RN  Outcome: Met This Shift  10/22/2020 0108 by Lauren Perdue RN  Outcome: Met This Shift     Problem:  Bowel/Gastric:  Goal: Bowel function will improve  Description: Bowel function will improve  10/22/2020 1425 by Miki Obrien RN  Outcome: Met This Shift  10/22/2020 0108 by Lauren Perdue RN  Outcome: Met This Shift  Goal: Diagnostic test results will improve  Description: Diagnostic test results will improve  10/22/2020 0108 by Lauren Perdue RN  Outcome: Met This Shift  Goal: Occurrences of nausea will decrease  Description: Occurrences of nausea will decrease  10/22/2020 1425 by Miki Obrien RN  Outcome: Met This Shift  10/22/2020 0108 by Lauren Perdue RN  Outcome: Met This Shift  Goal: Occurrences of vomiting will decrease  Description: Occurrences of vomiting will decrease  10/22/2020 1425 by Miki Obrien RN  Outcome: Met This Shift  10/22/2020 0108 by Lauren Perdue RN  Outcome: Met This Shift     Problem: Fluid Volume:  Goal: Maintenance of adequate hydration will improve  Description: Maintenance of adequate hydration will improve  10/22/2020 1425 by Miki Obrien RN  Outcome: Met This Shift  10/22/2020 0108 by Lauren Perdue RN  Outcome: Met This Shift Problem: Health Behavior:  Goal: Ability to state signs and symptoms to report to health care provider will improve  Description: Ability to state signs and symptoms to report to health care provider will improve  10/22/2020 1425 by Ranulfo Marshall RN  Outcome: Met This Shift  10/22/2020 0108 by Tip Barney RN  Outcome: Met This Shift     Problem: Physical Regulation:  Goal: Complications related to the disease process, condition or treatment will be avoided or minimized  Description: Complications related to the disease process, condition or treatment will be avoided or minimized  10/22/2020 0108 by Tip Barney RN  Outcome: Met This Shift  Goal: Ability to maintain clinical measurements within normal limits will improve  Description: Ability to maintain clinical measurements within normal limits will improve  10/22/2020 0108 by Tip Barney RN  Outcome: Met This Shift     Problem: Sensory:  Goal: Pain level will decrease  Description: Pain level will decrease  10/22/2020 1425 by Ranulfo Marshall RN  Outcome: Met This Shift  10/22/2020 0108 by Tip Barney RN  Outcome: Met This Shift  Goal: Ability to identify factors that increase the pain will improve  Description: Ability to identify factors that increase the pain will improve  10/22/2020 0108 by Tip Barney RN  Outcome: Met This Shift  Goal: Ability to notify healthcare provider of pain before it becomes unmanageable or unbearable will improve  Description: Ability to notify healthcare provider of pain before it becomes unmanageable or unbearable will improve  10/22/2020 0108 by Tip Barney RN  Outcome: Met This Shift

## 2020-10-22 NOTE — H&P
GENERAL SURGERY  HISTORY AND PHYSICAL  10/22/2020    Chief Complaint   Patient presents with    Other     was seen at [facility] told has an abscess on intestines and needed admitted       HPI  Isi Bustamante is a 52 y.o. male with a past medical history of Crohn's colitis. Follows with Dr. Amado Funes. Not on any medicine for his Crohn's disease. Patient has noticed a significant weight loss over the past 3 months. Has had Crohns since his late 25s. Has not required any surgeries. Has never required hospitalization for CD. No n/v. Does complain of early satiety and decreased appetite. Denies diarrhea. Has not seen Dr. Amado Funes in 10 years. On Suboxone       Past Medical History:   Diagnosis Date    Crohn's disease ([address]     PE (pulmonary embolism)        Past Surgical History:   Procedure Laterality Date    KNEE SURGERY Right     meniscus    VASECTOMY      WISDOM TOOTH EXTRACTION         Prior to Admission medications    Medication Sig Start Date End Date Taking? Authorizing Provider   buprenorphine-naloxone (SUBOXONE) 8-2 MG FILM SL film Place 1 Film under the tongue daily. Yes Historical Provider, MD       No Known Allergies    History reviewed. No pertinent family history.     Social History     Tobacco Use    Smoking status: Current Every Day Smoker     Packs/day: 1.00     Types: Cigarettes    Smokeless tobacco: Never Used   Substance Use Topics    Alcohol use: No    Drug use: Not Currently         Review of Systems - History obtained from chart review and the patient  General ROS: positive for  - weight loss  Psychological ROS: negative  Ophthalmic ROS: negative  ENT ROS: negative  Allergy and Immunology ROS: negative  Hematological and Lymphatic ROS: negative  Endocrine ROS: negative  Respiratory ROS: negative  Cardiovascular ROS: no chest pain or dyspnea on exertion  Gastrointestinal ROS: positive for - abdominal pain, appetite loss, change in bowel habits, change in stools and diarrhea  Genito-Urinary ROS: no dysuria, trouble voiding, or hematuria  Musculoskeletal ROS: negative  Neurological ROS: no TIA or stroke symptoms  Dermatological ROS: negative      PHYSICAL EXAM:    Vitals:    10/21/20 2200   BP: 132/84   Pulse: 87   Resp: 16   Temp: 98.4 °F (36.9 °C)   SpO2: 96%       General Appearance: slightly malnourished  Skin:  Skin color, texture, turgor normal. No rashes or lesions. Head/face:  NCAT  Eyes:  No gross abnormalities. Lungs:  Breathing Pattern: regular, no distress  Heart:  Heart regular rate and rhythm  Abdomen:  Soft, non-tender, normal bowel sounds. No bruits, organomegaly or masses. Musculoskeletal: Extremities warm to touch, pink, with no edema. Neurologic:  negative      LABS:  CBC  Recent Labs     10/22/20  0400   WBC 6.8   HGB 9.6*   HCT 31.0*   *     BMP  Recent Labs     10/22/20  0400      K 4.4      CO2 26   BUN 7   CREATININE 0.7   CALCIUM 9.0     Liver Function  Recent Labs     10/21/20  0907 10/21/20  2012   AMYLASE 24  --    LIPASE 11* 10*   BILITOT 0.5 0.4   AST 12 12   ALT 11 10   ALKPHOS 75 75   PROT 7.0 6.6   LABALBU 3.0* 2.9*     No results for input(s): LACTATE in the last 72 hours. No results for input(s): INR, PTT in the last 72 hours. Invalid input(s): PT    RADIOLOGY    Ct Abdomen Pelvis W Iv Contrast Additional Contrast? None    Result Date: 10/21/2020  EXAMINATION: CT OF THE ABDOMEN AND PELVIS WITH CONTRAST 10/21/2020 10:06 am TECHNIQUE: CT of the abdomen and pelvis was performed with the administration of intravenous contrast. Multiplanar reformatted images are provided for review. Dose modulation, iterative reconstruction, and/or weight based adjustment of the mA/kV was utilized to reduce the radiation dose to as low as reasonably achievable. COMPARISON: None.  HISTORY: ORDERING SYSTEM PROVIDED HISTORY: Lower abdominal pain TECHNOLOGIST PROVIDED HISTORY: Reason for exam:->Lower abdominal pain Additional Contrast?->None FINDINGS: Lower Chest: Minimal subpleural scarring in the right lower lobe. The lung bases otherwise clear. The heart is normal in size. Organs: Liver: Too small to definitively characterize hypodensities in the liver likely represent cysts. r the liver is mildly enlarged, measuring 20.8 cm in length. Gallbladder: Unremarkable. Pancreas: Unremarkable. Spleen:  Unremarkable. Adrenals: Unremarkable. Kidneys: Small cyst in the left kidney. Otherwise, unremarkable. He GI/Bowel: Severe inflammatory changes are seen in the sigmoid colon, with large extraluminal area of inflammation containing abscesses in free air. The extraluminal inflammatory collection measures approximately 9.8 x 7.8 cm in the maximum axial plane. A 2.1 cm intramural abscess is seen along the right lateral wall of the sigmoid colon (series 2, image 152). There is mural thickening of a small bowel loop in the upper pelvis, which is likely due to secondary involvement with the extraluminal inflammatory changes. There is moderate gaseous and fecal distention of the remainder of the colon. The appendix is not visualized. Pelvis: The urinary bladder is grossly unremarkable. The prostate gland is unremarkable. Peritoneum/Retroperitoneum: Soft tissue densities with surrounding fat stranding in the mesentery likely represent enlarged and inflamed lymph nodes (lymphadenitis). The largest of these lymph nodes measures approximately 2.6 x 1.5 cm. Mild calcified atherosclerosis is seen in the aorta. No aneurysm. Bones/Soft Tissues: The visualized bones are intact without fracture or focal lesion. Sclerosis along the sacroiliac joints may represent sacroiliitis related to patient's history of Crohn's disease. 1. Severe inflammatory changes in the sigmoid colon, with evidence of intramural abscess, as well as a large extramural inflammatory collection (9.8 x 7.8 cm), containing multiple locules of abscesses and free air.   The free air may be related to bowel perforation or infection with gas producing organisms. 2. Thickening of the small bowel loops in the region of the inflammatory changes likely represent secondary involvement of the bowel loop with the inflammation. 3. Enlarged ill-defined nodular densities in the mesentery likely represent enlarged lymph nodes. Surrounding fat stranding suggests lymphadenitis. 4. Mild hepatomegaly. 5. Too small to characterize hypodensities in the liver likely represent cysts. ASSESSMENT:  52 y.o. male with Crohn's disease. CT shows abscess    PLAN:    Consult GI Dr. Justin Garrison for CT A/P with PO contrast   Will discuss management with GI team    Electronically signed by Min Holloway MD on 10/22/20 at 5:16 AM EDT    I saw and examined the patient. I reviewed the above resident's note. I agree with the assessment and plan as outlined. Appears to have an exacerbation of his Crohn's disease with fistula and abscess. Await GI recommendations. I explained to the patient that he will likely need surgical resection of the affected area of bowel. Antibiotics for now.     Kevin Espino MD  General Surgery

## 2020-10-22 NOTE — ED NOTES
SB[facility] faxed to [facility] ; receipt confirmed with [facility] and fax confirmation. Updated that pt is ready and chimed.       Katty Carlos RN  10/21/20 2113

## 2020-10-22 NOTE — ED NOTES
Assumed care of patient. Pt lying in bed in no apparent distress. No visitors at bedside.      Poonam Ortega RN  10/21/20 2015

## 2020-10-22 NOTE — CARE COORDINATION
Social Work:    Social service met with Luis Ventura (Nicole Carrasquillo), explained social work role, and discussed discharge planning. Nicole Carrasquillo resides is employed, independent with ADL's, and resides in a two-story home with his fiancee. His PCP is Dr. Triny Lara and he uses [facility] in [facility]. Nicole Carrasquillo has never used [address] and does not expect to need it. He is receptive to recommendations. Social work to follow.     Electronically signed by GRAEME Valdivia on 10/22/2020 at 10:33 AM

## 2020-10-23 ENCOUNTER — APPOINTMENT (OUTPATIENT)
Dept: GENERAL RADIOLOGY | Age: 50
DRG: 385 | End: 2020-10-23
Payer: COMMERCIAL

## 2020-10-23 LAB
ALBUMIN SERPL-MCNC: 2.7 G/DL (ref 3.5–5.2)
ALP BLD-CCNC: 65 U/L (ref 40–129)
ALT SERPL-CCNC: 9 U/L (ref 0–40)
ANION GAP SERPL CALCULATED.3IONS-SCNC: 10 MMOL/L (ref 7–16)
AST SERPL-CCNC: 12 U/L (ref 0–39)
BASOPHILS ABSOLUTE: 0.03 E9/L (ref 0–0.2)
BASOPHILS RELATIVE PERCENT: 0.4 % (ref 0–2)
BILIRUB SERPL-MCNC: 0.3 MG/DL (ref 0–1.2)
BUN BLDV-MCNC: 10 MG/DL (ref 6–20)
C-REACTIVE PROTEIN: 11.7 MG/DL (ref 0–0.4)
CALCIUM SERPL-MCNC: 8.7 MG/DL (ref 8.6–10.2)
CHLORIDE BLD-SCNC: 98 MMOL/L (ref 98–107)
CO2: 26 MMOL/L (ref 22–29)
CREAT SERPL-MCNC: 0.7 MG/DL (ref 0.7–1.2)
EOSINOPHILS ABSOLUTE: 0.07 E9/L (ref 0.05–0.5)
EOSINOPHILS RELATIVE PERCENT: 0.9 % (ref 0–6)
GFR AFRICAN AMERICAN: >60
GFR NON-AFRICAN AMERICAN: >60 ML/MIN/1.73
GLUCOSE BLD-MCNC: 114 MG/DL (ref 74–99)
HCT VFR BLD CALC: 30 % (ref 37–54)
HEMOGLOBIN: 9.2 G/DL (ref 12.5–16.5)
IMMATURE GRANULOCYTES #: 0.02 E9/L
IMMATURE GRANULOCYTES %: 0.3 % (ref 0–5)
LYMPHOCYTES ABSOLUTE: 0.97 E9/L (ref 1.5–4)
LYMPHOCYTES RELATIVE PERCENT: 12.8 % (ref 20–42)
MCH RBC QN AUTO: 25.5 PG (ref 26–35)
MCHC RBC AUTO-ENTMCNC: 30.7 % (ref 32–34.5)
MCV RBC AUTO: 83.1 FL (ref 80–99.9)
MONOCYTES ABSOLUTE: 0.35 E9/L (ref 0.1–0.95)
MONOCYTES RELATIVE PERCENT: 4.6 % (ref 2–12)
NEUTROPHILS ABSOLUTE: 6.11 E9/L (ref 1.8–7.3)
NEUTROPHILS RELATIVE PERCENT: 81 % (ref 43–80)
PDW BLD-RTO: 17.6 FL (ref 11.5–15)
PLATELET # BLD: 715 E9/L (ref 130–450)
PMV BLD AUTO: 8.9 FL (ref 7–12)
POTASSIUM SERPL-SCNC: 3.8 MMOL/L (ref 3.5–5)
RBC # BLD: 3.61 E12/L (ref 3.8–5.8)
SEDIMENTATION RATE, ERYTHROCYTE: 57 MM/HR (ref 0–15)
SODIUM BLD-SCNC: 134 MMOL/L (ref 132–146)
TOTAL PROTEIN: 6.3 G/DL (ref 6.4–8.3)
WBC # BLD: 7.6 E9/L (ref 4.5–11.5)

## 2020-10-23 PROCEDURE — 36415 COLL VENOUS BLD VENIPUNCTURE: CPT

## 2020-10-23 PROCEDURE — 6360000002 HC RX W HCPCS: Performed by: STUDENT IN AN ORGANIZED HEALTH CARE EDUCATION/TRAINING PROGRAM

## 2020-10-23 PROCEDURE — 2580000003 HC RX 258: Performed by: STUDENT IN AN ORGANIZED HEALTH CARE EDUCATION/TRAINING PROGRAM

## 2020-10-23 PROCEDURE — 86140 C-REACTIVE PROTEIN: CPT

## 2020-10-23 PROCEDURE — 2500000003 HC RX 250 WO HCPCS: Performed by: RADIOLOGY

## 2020-10-23 PROCEDURE — 74250 X-RAY XM SM INT 1CNTRST STD: CPT

## 2020-10-23 PROCEDURE — 2580000003 HC RX 258: Performed by: SURGERY

## 2020-10-23 PROCEDURE — 99232 SBSQ HOSP IP/OBS MODERATE 35: CPT | Performed by: SURGERY

## 2020-10-23 PROCEDURE — 1200000000 HC SEMI PRIVATE

## 2020-10-23 PROCEDURE — 2580000003 HC RX 258: Performed by: SPECIALIST

## 2020-10-23 PROCEDURE — 85651 RBC SED RATE NONAUTOMATED: CPT

## 2020-10-23 PROCEDURE — 85025 COMPLETE CBC W/AUTO DIFF WBC: CPT

## 2020-10-23 PROCEDURE — 80053 COMPREHEN METABOLIC PANEL: CPT

## 2020-10-23 PROCEDURE — 6360000002 HC RX W HCPCS: Performed by: SPECIALIST

## 2020-10-23 RX ADMIN — SODIUM CHLORIDE 3 G: 900 INJECTION INTRAVENOUS at 17:08

## 2020-10-23 RX ADMIN — BUPRENORPHINE AND NALOXONE 0.5 FILM: 8; 2 FILM, SOLUBLE BUCCAL; SUBLINGUAL at 08:53

## 2020-10-23 RX ADMIN — SODIUM CHLORIDE 3 G: 900 INJECTION INTRAVENOUS at 23:10

## 2020-10-23 RX ADMIN — Medication 10 ML: at 21:18

## 2020-10-23 RX ADMIN — Medication 10 ML: at 08:53

## 2020-10-23 RX ADMIN — SODIUM CHLORIDE: 9 INJECTION, SOLUTION INTRAVENOUS at 12:53

## 2020-10-23 RX ADMIN — SODIUM CHLORIDE: 9 INJECTION, SOLUTION INTRAVENOUS at 04:08

## 2020-10-23 RX ADMIN — PIPERACILLIN SODIUM AND TAZOBACTAM SODIUM 3.38 G: 3; .375 INJECTION, POWDER, LYOPHILIZED, FOR SOLUTION INTRAVENOUS at 08:53

## 2020-10-23 RX ADMIN — BARIUM SULFATE 264 G: 960 POWDER, FOR SUSPENSION ORAL at 16:10

## 2020-10-23 RX ADMIN — SODIUM CHLORIDE, PRESERVATIVE FREE 10 ML: 5 INJECTION INTRAVENOUS at 17:08

## 2020-10-23 RX ADMIN — BUPRENORPHINE AND NALOXONE 0.5 FILM: 8; 2 FILM, SOLUBLE BUCCAL; SUBLINGUAL at 21:19

## 2020-10-23 ASSESSMENT — PAIN SCALES - GENERAL: PAINLEVEL_OUTOF10: 0

## 2020-10-23 NOTE — CONSULTS
[address] [place] [address] [facility]  Consultation Note     Admit Date: 10/21/2020  6:34 PM    Reason for Consult:   Crohn's flare. Abscess and fistula    Attending Physician:  Tristan Barlow, *    HISTORY OF PRESENT ILLNESS:             The history is obtained from extensive review of available past medical records. The patient is a 52 y.o. male who has a history of Crohn disease for about 30 years. 20 years ago he decided to stop treatment altogether because he did not feel that the treatment was helping. He has had constant abdominal pain and occasional episodes of diarrhea. For the last couple of months he noticed that he had lost most of his appetite and was eating less. He lost about 30 pounds of weight. When he went down to 150 pounds he decided it was time to seek medical help. He reports night sweats but says he has not had any fever but probably has not been taking temperatures. Past Medical History:        Diagnosis Date    Crohn's disease ([address]     PE (pulmonary embolism)      Past Surgical History:        Procedure Laterality Date    KNEE SURGERY Right     meniscus    VASECTOMY      WISDOM TOOTH EXTRACTION       Current Medications:   Scheduled Meds:   piperacillin-tazobactam  3.375 g Intravenous Q8H    sodium chloride flush  10 mL Intravenous BID    buprenorphine-naloxone  0.5 Film Sublingual BID    sodium chloride flush  10 mL Intravenous 2 times per day     Continuous Infusions:   sodium chloride 12.5 mL/hr at 10/23/20 1253     PRN Meds:sodium chloride flush, acetaminophen **OR** acetaminophen, promethazine **OR** ondansetron, magnesium sulfate, potassium chloride **OR** potassium alternative oral replacement **OR** potassium chloride    Allergies:  Patient has no known allergies. Social History:   Social History     Socioeconomic History    Marital status:       Spouse name: None    Number of children: None    Years of education: None    Highest education level: None   Occupational History    None   Social Needs    Financial resource strain: None    Food insecurity     Worry: None     Inability: None    Transportation needs     Medical: None     Non-medical: None   Tobacco Use    Smoking status: Current Every Day Smoker     Packs/day: 1.00     Types: Cigarettes    Smokeless tobacco: Never Used   Substance and Sexual Activity    Alcohol use: No    Drug use: Not Currently    Sexual activity: None   Lifestyle    Physical activity     Days per week: None     Minutes per session: None    Stress: None   Relationships    Social connections     Talks on phone: None     Gets together: None     Attends Oriental orthodox service: None     Active member of club or organization: None     Attends meetings of clubs or organizations: None     Relationship status: None    Intimate partner violence     Fear of current or ex partner: None     Emotionally abused: None     Physically abused: None     Forced sexual activity: None   Other Topics Concern    None   Social History Narrative    None      Pets: No  Travel: None  The patient works as a  in a [employer] [address] History:   History reviewed. No pertinent family history. . Otherwise non-pertinent to the chief complaint. REVIEW OF SYSTEMS:    Constitutional: Night sweats  Neurologic: Negative   Psychiatric: Negative  Rheumatologic: Negative   Endocrine: Negative  Hematologic: Negative  Immunologic: Negative  ENT: Negative  Respiratory: Negative   Cardiovascular: Negative  GI: As in the HPI  : He reports nocturia  Musculoskeletal: Negative  Skin: No rashes. PHYSICAL EXAM:    Vitals:   /73   Pulse 74   Temp 98.5 °F (36.9 °C) (Oral)   Resp 14   Ht 6' 2\" (1.88 m)   Wt 151 lb (68.5 kg)   SpO2 98%   BMI 19.39 kg/m²   Constitutional: The patient is awake, alert, and oriented. Looks slightly emaciated. He does not make much eye contact. Skin: Warm and dry.  No rashes were noted. HEENT: Eyes show round, and reactive pupils. No jaundice. Moist mucous membranes, no ulcerations, no thrush. Neck: Supple to movements. No lymphadenopathy. Chest: No use of accessory muscles to breathe. Symmetrical expansion. Auscultation reveals no wheezing, crackles, or rhonchi. Cardiovascular: S1 and S2 are rhythmic and regular. No murmurs appreciated. Abdomen: Positive bowel sounds to auscultation. Diffuse tenderness to palpation. No masses felt. No hepatosplenomegaly. Extremities: No clubbing, no cyanosis, no edema.   Lines: peripheral      CBC+dif:  Recent Labs     10/21/20  2012 10/22/20  0400 10/23/20  0835   WBC 9.5 6.8 7.6   HGB 9.2* 9.6* 9.2*   HCT 29.5* 31.0* 30.0*   MCV 83.3 83.3 83.1   * 679* 715*   NEUTROABS 7.13 4.40 6.11     Lab Results   Component Value Date    CRP 11.7 (H) 10/23/2020      No results found for: CRPHS  Lab Results   Component Value Date    SEDRATE 57 (H) 10/23/2020     Lab Results   Component Value Date    ALT 9 10/23/2020    AST 12 10/23/2020    ALKPHOS 65 10/23/2020    BILITOT 0.3 10/23/2020     Lab Results   Component Value Date     10/23/2020    K 3.8 10/23/2020    K 4.4 10/22/2020    CL 98 10/23/2020    CO2 26 10/23/2020    BUN 10 10/23/2020    CREATININE 0.7 10/23/2020    GFRAA >60 10/23/2020    LABGLOM >60 10/23/2020    GLUCOSE 114 10/23/2020    PROT 6.3 10/23/2020    LABALBU 2.7 10/23/2020    CALCIUM 8.7 10/23/2020    BILITOT 0.3 10/23/2020    ALKPHOS 65 10/23/2020    AST 12 10/23/2020    ALT 9 10/23/2020       Lab Results   Component Value Date    PROTIME 14.0 10/22/2020    INR 1.2 10/22/2020       Lab Results   Component Value Date    TSH 1.600 10/21/2020       Lab Results   Component Value Date    COLORU Yellow 10/21/2020    PHUR 5.5 10/21/2020    WBCUA NONE 10/21/2020    RBCUA NONE 10/21/2020    BACTERIA NONE SEEN 10/21/2020    CLARITYU Clear 10/21/2020    SPECGRAV 1.015 10/21/2020    LEUKOCYTESUR Negative 10/21/2020    UROBILINOGEN 2.0 10/21/2020    BILIRUBINUR Negative 10/21/2020    BLOODU Negative 10/21/2020    GLUCOSEU Negative 10/21/2020       No results found for: HCO3, BE, O2SAT, PH, THGB, PCO2, PO2, TCO2  Radiology:  CT of the abdomen and pelvis reviewed    Microbiology:  Pending  No results for input(s): BC in the last 72 hours. No results for input(s): ORG in the last 72 hours. No results for input(s): April  in the last 72 hours. No results for input(s): STREPNEUMAGU in the last 72 hours. No results for input(s): LP1UAG in the last 72 hours. No results for input(s): ASO in the last 72 hours. No results for input(s): CULTRESP in the last 72 hours. No results for input(s): PROCAL in the last 72 hours. Assessment:  · Crohn disease with exacerbation   · Probable fevers at home. Patient has having night sweats  · Intra-abdominal abscesses associated to Crohn disease   · Weight loss associated to the above    Plan:    · Change Zosyn over to Unasyn  · Monitor labs  · Will follow with you    Thank you for having us see this patient in consultation. I will be discussing this case with the treating physicians.     Taylor Tillman  2:55 PM  10/23/2020

## 2020-10-23 NOTE — PROGRESS NOTES
GENERAL SURGERY  DAILY PROGRESS NOTE  10/23/2020  Complaint: Abdominal pain     Subjective:    No abdominal pain. Awaiting GI consult. Tolerating diet    Objective:  /64   Pulse 85   Temp 97.8 °F (36.6 °C) (Oral)   Resp 16   Ht 6' 2\" (1.88 m)   Wt 151 lb (68.5 kg)   SpO2 94%   BMI 19.39 kg/m²     GENERAL:  Laying in bed, awake, alert, cooperative, no apparent distress  HEAD: Normocephalic, atraumatic  EYES: No sclera icterus, pupils equal  LUNGS:  No increased work of breathing  CARDIOVASCULAR:  RR  ABDOMEN:  Soft, non-tender, non-distended  EXTREMITIES: No edema or swelling  SKIN: Warm and dry    Assessment/Plan:  52 y.o. male with Crohn's disease. CT shows abscess     PLAN:     Consult GI -awaiting recommendations  Continue antibiotics    Electronically signed by Flaquito King MD on 10/23/2020 at 7:06 AM    I saw and examined the patient. I reviewed the above resident's note. I agree with the assessment and plan as outlined. Consult ID for antibiotic management  SBFT with barium to evaluate his Crohn's disease further.     Chester Hdez MD  General Surgery

## 2020-10-23 NOTE — CONSULTS
periods without symptoms, then he may have symptoms return for short period of time. Pt states he has gas pain 9-10/10 with intermittent diarrhea but is unable to discern how often, he denies any for about 2 weeks. Patient states his wife had colon cancer 10 years ago and he was taking her Vicodin and Percocet when he had any Crohn's symptoms stating it made him feel better once in a while then he started taking the narcotics all the time, he sought help and is now on suboxone for the past 3 years consistently but prior started Suboxone in 2015 off and on stating it helps his pain and crohn's. Pt states he has 1 daily BM, softly formed brown. He states his last bout of diarrhea was [place] while ago. \"  He is a poor historian, cannot remember how many times a day he has diarrhea when he has it or when the last time was he had diarrhea or how often in a month or even a week. He states his biggest concern however is his unintentional weight loss stating he weighed 190#in the winter, lost some weight [facility] time, then again in the summer around June and then more in April, his current weight is 151#. He states he has lost weight in the past and then gained it back however this time he lost 30# without gain. Pt denies fever, chills, n/v or hematemesis, melena, or hematochezia. Admission labs albumin 3.0; lipase 11; RBC 3.47; H&H 9.2/28.8; MCHC 31.9; RDW 17.9; platelet 609; neut 47.3%; lymph 8.2%; absolute neut 9.64; absolute lymph 0.92; absolute eosin 0.02; chloride 96. CT abdomen/pelvis W IV contrast 10/22/20- 1. Compared to the CT of the abdomen and pelvis from yesterday, there is no significant interval change. 2. Again noted is a large, ill-defined, multilocular abscess in the lower abdominal and pelvic mesentery. An intramural abscess is seen in the wall of the sigmoid colon.  3. Mural thickening and inflammatory changes are also seen in the wall of the distal ileum, which traverses the site of the inflammation. There is mild distension of the proximal small bowel loops, which may be due to ileus or partial obstruction. Gaseous distention of the colon is also noted. 4. Grossly stable mesenteric lymphadenitis. 5. Findings of mild sacroiliitis, which may be enteropathic, related to patient's history of Crohn's disease. Prior CT abdomen/pelvis W IV contrast 10/21/20 at [facility] ED- 1. Severe inflammatory changes in the sigmoid colon, with evidence of intramural abscess, as well as a large extramural inflammatory collection (9.8 x 7.8 cm), containing multiple locules of abscesses and free air. The free air may be related to bowel perforation or infection with gas producing organisms. 2. Thickening of the small bowel loops in the region of the inflammatory changes likely represent secondary involvement of the bowel loop with the inflammation. 3. Enlarged ill-defined nodular densities in the mesentery likely represent enlarged lymph nodes. Surrounding fat stranding suggests lymphadenitis. 4. Mild hepatomegaly. 5. Too small to characterize hypodensities in the liver likely represent cysts. Consultation for Crohn's disease, patient known to you. Pt is known to Dr. Jeff Rivas, Dr Nithin Cabral covering. Currently, pt reports no abdominal pain, nausea or vomiting. Labs today ordered and pending.     Past Medical History:        Diagnosis Date    Crohn's disease ([address]     PE (pulmonary embolism)      Past Surgical History:        Procedure Laterality Date    KNEE SURGERY Right     meniscus    VASECTOMY      WISDOM TOOTH EXTRACTION       Current Medications:    Current Facility-Administered Medications: piperacillin-tazobactam (ZOSYN) 3.375 g in dextrose 5 % 50 mL IVPB extended infusion (mini-bag), 3.375 g, Intravenous, Q8H  0.9 % sodium chloride infusion, , Intravenous, Q8H  sodium chloride flush 0.9 % injection 10 mL, 10 mL, Intravenous, BID  buprenorphine-naloxone (SUBOXONE) 8-2 MG SL film 0.5 Film, 0.5 Film, Sublingual, BID  sodium chloride flush 0.9 % injection 10 mL, 10 mL, Intravenous, 2 times per day  sodium chloride flush 0.9 % injection 10 mL, 10 mL, Intravenous, PRN  acetaminophen (TYLENOL) tablet 650 mg, 650 mg, Oral, Q6H PRN **OR** acetaminophen (TYLENOL) suppository 650 mg, 650 mg, Rectal, Q6H PRN  promethazine (PHENERGAN) tablet 12.5 mg, 12.5 mg, Oral, Q6H PRN **OR** ondansetron (ZOFRAN) injection 4 mg, 4 mg, Intravenous, Q6H PRN  magnesium sulfate 2 g in 50 mL IVPB premix, 2 g, Intravenous, PRN  potassium chloride (KLOR-CON M) extended release tablet 40 mEq, 40 mEq, Oral, PRN **OR** potassium bicarb-citric acid (EFFER-K) effervescent tablet 40 mEq, 40 mEq, Oral, PRN **OR** potassium chloride 10 mEq/100 mL IVPB (Peripheral Line), 10 mEq, Intravenous, PRN    Allergies:  Patient has no known allergies. Social History:    Tobacco:  Pt smokes cigarettes 1PPD for 20+ years  Alcohol:  Pt denies  Illicit Drugs: Pt history of prescription narcotic use, now on suboxone    Family History: Mother living, she has dementia/anxiety-\"degenerative brain disease. \"  Father- unknown, no contact in over 21 years  Sister- living, unknown  Children (3)- living, healthy    REVIEW OF SYSTEMS:    Aside from what was mentioned in the PMH and HPI, essentially unremarkable, all others negative. PHYSICAL EXAM:      Vitals:    /73   Pulse 74   Temp 98.5 °F (36.9 °C) (Oral)   Resp 14   Ht 6' 2\" (1.88 m)   Wt 151 lb (68.5 kg)   SpO2 98%   BMI 19.39 kg/m²       CONSTITUTIONAL:  awake, alert, cooperative, pale, fatigued appearing, thin, and appears stated age  EYES:  pupils equal, round and reactive to light, sclera anicteric and conjunctiva pale  ENT:  normocephalic, oral pharynx with moist mucous membranes  LUNGS: clear to auscultation bilaterally.   CARDIOVASCULAR: regular rate and rhythm, no murmur noted; 2+ pulses; no edema  ABDOMEN: Hypoactive bowel sounds, soft, non-distended, non-tender, no masses palpated, no hepatosplenomegally  MUSCULOSKELETAL:  full range of motion noted  motor strength is 5 out of 5 all extremities bilaterally  NEUROLOGIC:  Mental Status Exam:  Level of Alertness:   awake  Orientation:   person, place, time  Motor Exam:  Motor exam is symmetrical 5 out of 5 all extremities bilaterally  SKIN:  pale skin color     DATA:    CBC with Differential:    Lab Results   Component Value Date    WBC 7.6 10/23/2020    RBC 3.61 10/23/2020    HGB 9.2 10/23/2020    HCT 30.0 10/23/2020     10/23/2020    MCV 83.1 10/23/2020    MCH 25.5 10/23/2020    MCHC 30.7 10/23/2020    RDW 17.6 10/23/2020    LYMPHOPCT 12.8 10/23/2020    MONOPCT 4.6 10/23/2020    BASOPCT 0.4 10/23/2020    MONOSABS 0.35 10/23/2020    LYMPHSABS 0.97 10/23/2020    EOSABS 0.07 10/23/2020    BASOSABS 0.03 10/23/2020     CMP:    Lab Results   Component Value Date     10/23/2020    K 3.8 10/23/2020    K 4.4 10/22/2020    CL 98 10/23/2020    CO2 26 10/23/2020    BUN 10 10/23/2020    CREATININE 0.7 10/23/2020    GFRAA >60 10/23/2020    LABGLOM >60 10/23/2020    GLUCOSE 114 10/23/2020    PROT 6.3 10/23/2020    LABALBU 2.7 10/23/2020    CALCIUM 8.7 10/23/2020    BILITOT 0.3 10/23/2020    ALKPHOS 65 10/23/2020    AST 12 10/23/2020    ALT 9 10/23/2020     Hepatic Function Panel:    Lab Results   Component Value Date    ALKPHOS 65 10/23/2020    ALT 9 10/23/2020    AST 12 10/23/2020    PROT 6.3 10/23/2020    BILITOT 0.3 10/23/2020    LABALBU 2.7 10/23/2020     PT/INR:    Lab Results   Component Value Date    PROTIME 14.0 10/22/2020    INR 1.2 10/22/2020     PTT:    Lab Results   Component Value Date    APTT 27.5 01/27/2016   [APTT}  Last 3 Troponin:    Lab Results   Component Value Date    TROPONINI <0.01 07/10/2016    TROPONINI <0.01 01/27/2016     TSH:    Lab Results   Component Value Date    TSH 1.600 10/21/2020         Ct Abdomen Pelvis W Iv Contrast Additional Contrast? Oral    Result Date: 10/22/2020  EXAMINATION: CT OF THE ABDOMEN AND PELVIS WITH CONTRAST 10/22/2020 11:08 am TECHNIQUE: CT of the abdomen and pelvis was performed with the administration of intravenous contrast. Multiplanar reformatted images are provided for review. Dose modulation, iterative reconstruction, and/or weight based adjustment of the mA/kV was utilized to reduce the radiation dose to as low as reasonably achievable. COMPARISON: CT of the abdomen and pelvis, 10/21/2020 HISTORY: ORDERING SYSTEM PROVIDED HISTORY: crohn's diasease, abscess TECHNOLOGIST PROVIDED HISTORY: Reason for exam:->crohn's diasease, abscess Additional Contrast?->Oral FINDINGS: Lower Chest: Minimal subpleural scarring in the right lower lobe. Otherwise the lung bases. Heart is in size. Organs: Liver: Too small to characterize hypodensities liver likely represent cysts. Gallbladder: Unremarkable. Pancreas: Unremarkable. Spleen:  Unremarkable. Adrenals: Unremarkable. Kidneys: Small left renal cyst.  Otherwise, GI/Bowel: There is no significant interval change in the bowel inflammatory changes as of yesterday. Again noted is a large, irregularly marginated a multilocular abscess in the lower pelvis. The inflammatory collection measures approximately 9.2 x 7.2 cm in the maximal axial plane. An abscess measuring approximately 2.5 cm is seen along wall of the it has sigmoid colon. Gas associated with the abscesses may signify infection with gas producing organisms or related to bowel perforation. In addition to the sigmoid colon, there is mural thickening of the distal ileal bowel loop, which traverses the site of the abscess. There is mild fluid distention of the proximal small bowel loops to maximum caliber of approximately 4 cm. Liquid stool is seen in the proximal colon. Pelvis: The urinary bladder and prostate are grossly unremarkable. Peritoneum/Retroperitoneum: Again noted are enlarged mesenteric lymph nodes with ill-defined margins and surrounding fat stranding likely representing lymphadenitis. Bones/Soft Tissues: Sclerosis along the iliac aspect of the sacroiliac joints, associated with tiny cortical erosions may be related to enteropathic sacroiliitis, related to patient's history of Crohn's disease. 1. Compared to the CT of the abdomen and pelvis from yesterday, there is no significant interval change. 2. Again noted is a large, ill-defined, multilocular abscess in the lower abdominal and pelvic mesentery. An intramural abscess is seen in the wall of the sigmoid colon. 3. Mural thickening and inflammatory changes are also seen in the wall of the distal ileum, which traverses the site of the inflammation. There is mild distension of the proximal small bowel loops, which may be due to ileus or partial obstruction. Gaseous distention of the colon is also noted. 4. Grossly stable mesenteric lymphadenitis. 5. Findings of mild sacroiliitis, which may be enteropathic, related to patient's history of Crohn's disease. Ct Abdomen Pelvis W Iv Contrast Additional Contrast? None    Result Date: 10/21/2020  EXAMINATION: CT OF THE ABDOMEN AND PELVIS WITH CONTRAST 10/21/2020 10:06 am TECHNIQUE: CT of the abdomen and pelvis was performed with the administration of intravenous contrast. Multiplanar reformatted images are provided for review. Dose modulation, iterative reconstruction, and/or weight based adjustment of the mA/kV was utilized to reduce the radiation dose to as low as reasonably achievable. COMPARISON: None. HISTORY: ORDERING SYSTEM PROVIDED HISTORY: Lower abdominal pain TECHNOLOGIST PROVIDED HISTORY: Reason for exam:->Lower abdominal pain Additional Contrast?->None FINDINGS: Lower Chest: Minimal subpleural scarring in the right lower lobe. The lung bases otherwise clear. The heart is normal in size. Organs: Liver: Too small to definitively characterize hypodensities in the liver likely represent cysts. r the liver is mildly enlarged, measuring 20.8 cm in length. Gallbladder: Unremarkable. Pancreas: Unremarkable. Spleen:  Unremarkable. Adrenals: Unremarkable. Kidneys: Small cyst in the left kidney. Otherwise, unremarkable. He GI/Bowel: Severe inflammatory changes are seen in the sigmoid colon, with large extraluminal area of inflammation containing abscesses in free air. The extraluminal inflammatory collection measures approximately 9.8 x 7.8 cm in the maximum axial plane. A 2.1 cm intramural abscess is seen along the right lateral wall of the sigmoid colon (series 2, image 152). There is mural thickening of a small bowel loop in the upper pelvis, which is likely due to secondary involvement with the extraluminal inflammatory changes. There is moderate gaseous and fecal distention of the remainder of the colon. The appendix is not visualized. Pelvis: The urinary bladder is grossly unremarkable. The prostate gland is unremarkable. Peritoneum/Retroperitoneum: Soft tissue densities with surrounding fat stranding in the mesentery likely represent enlarged and inflamed lymph nodes (lymphadenitis). The largest of these lymph nodes measures approximately 2.6 x 1.5 cm. Mild calcified atherosclerosis is seen in the aorta. No aneurysm. Bones/Soft Tissues: The visualized bones are intact without fracture or focal lesion. Sclerosis along the sacroiliac joints may represent sacroiliitis related to patient's history of Crohn's disease. 1. Severe inflammatory changes in the sigmoid colon, with evidence of intramural abscess, as well as a large extramural inflammatory collection (9.8 x 7.8 cm), containing multiple locules of abscesses and free air. The free air may be related to bowel perforation or infection with gas producing organisms. 2. Thickening of the small bowel loops in the region of the inflammatory changes likely represent secondary involvement of the bowel loop with the inflammation. 3. Enlarged ill-defined nodular densities in the mesentery likely represent enlarged lymph nodes. Surrounding fat stranding suggests lymphadenitis. 4. Mild hepatomegaly. 5. Too small to characterize hypodensities in the liver likely represent cysts. IMPRESSION:  · Crohn's disease- with large, ill-defined, multilocular abscess in the lower abdominal and pelvic mesentery; intramural abscess is seen in the wall of the sigmoid colon  · Abdominal pain, resolved  · Nausea -resolved  · Lymphadenitis  · Anemia, normocytic, hypochromic  · Hx Opioid abuse, currently on Suboxone - defer to surgery     RECOMMENDATIONS:    · CBC, CMP now and daily  · ESR, CRP now  · Calprotectin stool    · Zosyn per surgery  · Suboxone per surgery  · Medicate for pain and nausea per surgery  · Supportive care  · Continue to monitor    Note: This report was completed utilizing computer voice recognition software. Every effort has been made to ensure accuracy, however; inadvertent computerized transcription errors may be present. Thank you very much for your consultation. We will follow closely with you. Discussed with Dr. Rick Escudero developed by Dr. Amanda BAINS-ACNS-BC, FNP-BC 10/23/2020 1:13 PM for Dr. Abbey Hernandez. I HAD A FACE TO FACE ENCOUNTER WITH THE PATIENT. AGREE WITH THE EXAM, ASSESSMENT, AND PLAN AS OUTLINED ABOVE. ADDITION AND CORRECTIONS WERE DONE AS DEEMED APPROPRIATE. MY EXAM AND PLAN INCLUDE:   DOING BETTER ON ANTIBIOTICS/QUESTIONABLE NEED FOR DRAINAGE. PT WANTS PREDNISONE EXPLAINED TO HIM WHY NOT, NEEDS ANTIBIOTIC. DISCUSSED WITH PATIENT.

## 2020-10-23 NOTE — PROGRESS NOTES
Patient stated he spilled his fresh hot coffee on the bed and his groin area. Skin assessed with no redness observed, WNL. Patient stated he was sitting in bed drinking his coffee and tried to move the over the bed table, spilling the coffee. [employer] completed.

## 2020-10-23 NOTE — PLAN OF CARE
Problem: Pain:  Goal: Pain level will decrease  Description: Pain level will decrease  10/23/2020 0031 by Milagros Ambrose  Outcome: Met This Shift  10/22/2020 1425 by Moody De Souza, RN  Outcome: Met This Shift  Goal: Control of acute pain  Description: Control of acute pain  10/23/2020 0031 by Milagros Ambrose  Outcome: Met This Shift  10/22/2020 1425 by Moody De Souza, RN  Outcome: Met This Shift  Goal: Control of chronic pain  Description: Control of chronic pain  10/23/2020 0031 by Milagros Ambrose  Outcome: Met This Shift  10/22/2020 1425 by Moody De Souza, RN  Outcome: Met This Shift     Problem: Activity:  Goal: Risk for activity intolerance will decrease  Description: Risk for activity intolerance will decrease  10/23/2020 0031 by Milagros Ambrose  Outcome: Met This Shift  10/22/2020 1425 by Moody De Souza, RN  Outcome: Met This Shift     Problem:  Bowel/Gastric:  Goal: Occurrences of nausea will decrease  Description: Occurrences of nausea will decrease  10/23/2020 0031 by Milagros Ambrose  Outcome: Met This Shift  10/22/2020 1425 by Moody De Souza, RN  Outcome: Met This Shift  Goal: Occurrences of vomiting will decrease  Description: Occurrences of vomiting will decrease  10/23/2020 0031 by Milagros Ambrose  Outcome: Met This Shift  10/22/2020 1425 by Moody De Souza, RN  Outcome: Met This Shift     Problem: Fluid Volume:  Goal: Maintenance of adequate hydration will improve  Description: Maintenance of adequate hydration will improve  10/23/2020 0031 by Milagros Ambrose  Outcome: Met This Shift  10/22/2020 1425 by Moody De Souza, RN  Outcome: Met This Shift     Problem: Health Behavior:  Goal: Ability to state signs and symptoms to report to health care provider will improve  Description: Ability to state signs and symptoms to report to health care provider will improve  10/23/2020 0031 by Milagros Ambrose  Outcome: Met This Shift  10/22/2020 1425 by Moody De Souza, RN  Outcome: Met This Shift     Problem: Physical Regulation:  Goal: Complications related to the disease process, condition or treatment will be avoided or minimized  Description: Complications related to the disease process, condition or treatment will be avoided or minimized  Outcome: Met This Shift  Goal: Ability to maintain clinical measurements within normal limits will improve  Description: Ability to maintain clinical measurements within normal limits will improve  Outcome: Met This Shift     Problem: Sensory:  Goal: Pain level will decrease  Description: Pain level will decrease  10/23/2020 0031 by Cintia May  Outcome: Met This Shift  10/22/2020 1425 by Jose Gutierrez RN  Outcome: Met This Shift  Goal: Ability to identify factors that increase the pain will improve  Description: Ability to identify factors that increase the pain will improve  Outcome: Met This Shift  Goal: Ability to notify healthcare provider of pain before it becomes unmanageable or unbearable will improve  Description: Ability to notify healthcare provider of pain before it becomes unmanageable or unbearable will improve  Outcome: Met This Shift

## 2020-10-24 LAB
ALBUMIN SERPL-MCNC: 2.9 G/DL (ref 3.5–5.2)
ALP BLD-CCNC: 66 U/L (ref 40–129)
ALT SERPL-CCNC: 8 U/L (ref 0–40)
ANION GAP SERPL CALCULATED.3IONS-SCNC: 9 MMOL/L (ref 7–16)
AST SERPL-CCNC: 13 U/L (ref 0–39)
BASOPHILS ABSOLUTE: 0.03 E9/L (ref 0–0.2)
BASOPHILS RELATIVE PERCENT: 0.5 % (ref 0–2)
BILIRUB SERPL-MCNC: 0.3 MG/DL (ref 0–1.2)
BUN BLDV-MCNC: 7 MG/DL (ref 6–20)
CALCIUM SERPL-MCNC: 8.8 MG/DL (ref 8.6–10.2)
CHLORIDE BLD-SCNC: 100 MMOL/L (ref 98–107)
CO2: 27 MMOL/L (ref 22–29)
CREAT SERPL-MCNC: 0.7 MG/DL (ref 0.7–1.2)
EOSINOPHILS ABSOLUTE: 0.13 E9/L (ref 0.05–0.5)
EOSINOPHILS RELATIVE PERCENT: 2.2 % (ref 0–6)
GFR AFRICAN AMERICAN: >60
GFR NON-AFRICAN AMERICAN: >60 ML/MIN/1.73
GLUCOSE BLD-MCNC: 109 MG/DL (ref 74–99)
HCT VFR BLD CALC: 32.7 % (ref 37–54)
HEMOGLOBIN: 9.8 G/DL (ref 12.5–16.5)
IMMATURE GRANULOCYTES #: 0.02 E9/L
IMMATURE GRANULOCYTES %: 0.3 % (ref 0–5)
LYMPHOCYTES ABSOLUTE: 1.19 E9/L (ref 1.5–4)
LYMPHOCYTES RELATIVE PERCENT: 20.1 % (ref 20–42)
MCH RBC QN AUTO: 25.3 PG (ref 26–35)
MCHC RBC AUTO-ENTMCNC: 30 % (ref 32–34.5)
MCV RBC AUTO: 84.5 FL (ref 80–99.9)
MONOCYTES ABSOLUTE: 0.24 E9/L (ref 0.1–0.95)
MONOCYTES RELATIVE PERCENT: 4.1 % (ref 2–12)
NEUTROPHILS ABSOLUTE: 4.31 E9/L (ref 1.8–7.3)
NEUTROPHILS RELATIVE PERCENT: 72.8 % (ref 43–80)
PDW BLD-RTO: 18 FL (ref 11.5–15)
PLATELET # BLD: 771 E9/L (ref 130–450)
PMV BLD AUTO: 8.9 FL (ref 7–12)
POTASSIUM SERPL-SCNC: 3.7 MMOL/L (ref 3.5–5)
RBC # BLD: 3.87 E12/L (ref 3.8–5.8)
SODIUM BLD-SCNC: 136 MMOL/L (ref 132–146)
TOTAL PROTEIN: 6.5 G/DL (ref 6.4–8.3)
WBC # BLD: 5.9 E9/L (ref 4.5–11.5)

## 2020-10-24 PROCEDURE — 85025 COMPLETE CBC W/AUTO DIFF WBC: CPT

## 2020-10-24 PROCEDURE — 2580000003 HC RX 258: Performed by: SURGERY

## 2020-10-24 PROCEDURE — 36415 COLL VENOUS BLD VENIPUNCTURE: CPT

## 2020-10-24 PROCEDURE — 83993 ASSAY FOR CALPROTECTIN FECAL: CPT

## 2020-10-24 PROCEDURE — 80053 COMPREHEN METABOLIC PANEL: CPT

## 2020-10-24 PROCEDURE — 99233 SBSQ HOSP IP/OBS HIGH 50: CPT | Performed by: SURGERY

## 2020-10-24 PROCEDURE — 2580000003 HC RX 258: Performed by: SPECIALIST

## 2020-10-24 PROCEDURE — 1200000000 HC SEMI PRIVATE

## 2020-10-24 PROCEDURE — 6360000002 HC RX W HCPCS: Performed by: SPECIALIST

## 2020-10-24 RX ORDER — LIDOCAINE HYDROCHLORIDE 10 MG/ML
5 INJECTION, SOLUTION EPIDURAL; INFILTRATION; INTRACAUDAL; PERINEURAL ONCE
Status: COMPLETED | OUTPATIENT
Start: 2020-10-24 | End: 2020-10-25

## 2020-10-24 RX ORDER — SODIUM CHLORIDE 0.9 % (FLUSH) 0.9 %
10 SYRINGE (ML) INJECTION PRN
Status: DISCONTINUED | OUTPATIENT
Start: 2020-10-24 | End: 2020-10-26 | Stop reason: HOSPADM

## 2020-10-24 RX ORDER — HEPARIN SODIUM (PORCINE) LOCK FLUSH IV SOLN 100 UNIT/ML 100 UNIT/ML
3 SOLUTION INTRAVENOUS EVERY 12 HOURS SCHEDULED
Status: DISCONTINUED | OUTPATIENT
Start: 2020-10-24 | End: 2020-10-26 | Stop reason: HOSPADM

## 2020-10-24 RX ORDER — HEPARIN SODIUM (PORCINE) LOCK FLUSH IV SOLN 100 UNIT/ML 100 UNIT/ML
3 SOLUTION INTRAVENOUS PRN
Status: DISCONTINUED | OUTPATIENT
Start: 2020-10-24 | End: 2020-10-26 | Stop reason: HOSPADM

## 2020-10-24 RX ADMIN — SODIUM CHLORIDE 3 G: 900 INJECTION INTRAVENOUS at 04:40

## 2020-10-24 RX ADMIN — BUPRENORPHINE AND NALOXONE 0.5 FILM: 8; 2 FILM, SOLUBLE BUCCAL; SUBLINGUAL at 20:51

## 2020-10-24 RX ADMIN — Medication 10 ML: at 21:45

## 2020-10-24 RX ADMIN — Medication 10 ML: at 09:09

## 2020-10-24 RX ADMIN — SODIUM CHLORIDE 3 G: 900 INJECTION INTRAVENOUS at 23:00

## 2020-10-24 RX ADMIN — SODIUM CHLORIDE 3 G: 900 INJECTION INTRAVENOUS at 10:34

## 2020-10-24 RX ADMIN — BUPRENORPHINE AND NALOXONE 0.5 FILM: 8; 2 FILM, SOLUBLE BUCCAL; SUBLINGUAL at 09:08

## 2020-10-24 RX ADMIN — SODIUM CHLORIDE, PRESERVATIVE FREE 10 ML: 5 INJECTION INTRAVENOUS at 17:00

## 2020-10-24 RX ADMIN — SODIUM CHLORIDE 3 G: 900 INJECTION INTRAVENOUS at 16:59

## 2020-10-24 RX ADMIN — SODIUM CHLORIDE, PRESERVATIVE FREE 10 ML: 5 INJECTION INTRAVENOUS at 10:34

## 2020-10-24 ASSESSMENT — PAIN SCALES - GENERAL
PAINLEVEL_OUTOF10: 0
PAINLEVEL_OUTOF10: 0

## 2020-10-24 NOTE — PROGRESS NOTES
[address] [place] [address] [facility]  Progress Note    SUBJECTIVE:  Chief Complaint   Patient presents with    Other     was seen at [facility] told has an abscess on intestines and needed admitted     The patient is feeling better. He did not realize that he is no longer having night sweats. He says that the abdominal pain is gone. Review of systems:  As stated above in the chief complaint, otherwise negative. Medications:  Scheduled Meds:   ampicillin-sulbactam  3 g Intravenous Q6H    sodium chloride flush  10 mL Intravenous BID    buprenorphine-naloxone  0.5 Film Sublingual BID    sodium chloride flush  10 mL Intravenous 2 times per day     Continuous Infusions:  PRN Meds:sodium chloride flush, acetaminophen **OR** acetaminophen, promethazine **OR** ondansetron, magnesium sulfate, potassium chloride **OR** potassium alternative oral replacement **OR** potassium chloride    OBJECTIVE:  /62   Pulse 76   Temp 98.1 °F (36.7 °C) (Oral)   Resp 18   Ht 6' 2\" (1.88 m)   Wt 159 lb 8 oz (72.3 kg)   SpO2 97%   BMI 20.48 kg/m²   Temp  Av.2 °F (36.8 °C)  Min: 98.1 °F (36.7 °C)  Max: 98.3 °F (36.8 °C)  Constitutional: The patient is awake, alert, and oriented. No distress. Affect is somewhat flat. Skin: Warm and dry. No rashes were noted. HEENT: Round and reactive pupils. Moist mucous membranes. No ulcerations or thrush. Neck: Supple to movements. Chest: No use of accessory muscles to breathe. Symmetrical expansion. No wheezing, crackles or rhonchi. Cardiovascular: S1 and S2 are rhythmic and regular. No murmurs appreciated. Abdomen: Positive bowel sounds to auscultation. Minimally tender to palpation. No masses felt. No hepatosplenomegaly. Extremities: No clubbing, no cyanosis, no edema.   Lines: peripheral    Laboratory and Tests Review:  Lab Results   Component Value Date    WBC 5.9 10/24/2020    WBC 7.6 10/23/2020    WBC 6.8 10/22/2020    HGB 9.8 (L) 10/24/2020 HCT 32.7 (L) 10/24/2020    MCV 84.5 10/24/2020     (H) 10/24/2020     Lab Results   Component Value Date    NEUTROABS 4.31 10/24/2020    NEUTROABS 6.11 10/23/2020    NEUTROABS 4.40 10/22/2020     No results found for: CRPHS  Lab Results   Component Value Date    ALT 8 10/24/2020    AST 13 10/24/2020    ALKPHOS 66 10/24/2020    BILITOT 0.3 10/24/2020     Lab Results   Component Value Date     10/24/2020    K 3.7 10/24/2020    K 4.4 10/22/2020     10/24/2020    CO2 27 10/24/2020    BUN 7 10/24/2020    CREATININE 0.7 10/24/2020    CREATININE 0.7 10/23/2020    CREATININE 0.7 10/22/2020    GFRAA >60 10/24/2020    LABGLOM >60 10/24/2020    GLUCOSE 109 10/24/2020    PROT 6.5 10/24/2020    LABALBU 2.9 10/24/2020    CALCIUM 8.8 10/24/2020    BILITOT 0.3 10/24/2020    ALKPHOS 66 10/24/2020    AST 13 10/24/2020    ALT 8 10/24/2020     Lab Results   Component Value Date    CRP 11.7 (H) 10/23/2020     Lab Results   Component Value Date    SEDRATE 62 (H) 10/23/2020     Radiology:      Microbiology:   Blood cultures were ordered but canceled    ASSESSMENT:  · Crohn's flareup  · Multiple intra-abdominal abscesses    PLAN:  · Continue empiric Unasyn  · Surgery has no plans for drainage  · PICC for IV antibiotic    Informed Consent for PICC:  I explained the risks, benefits, alternative options, and potential complications associated with insertion of Peripherally Inserted Central Catheter (PICC) with the patient prior to the procedure.     Electronically signed by Eliazar Olivarez MD on 10/24/2020 at 1:06 PM     Eliazar Olivarez  11:51 AM  10/24/2020

## 2020-10-24 NOTE — PLAN OF CARE
Problem: Pain:  Goal: Pain level will decrease  Description: Pain level will decrease  Outcome: Met This Shift     Problem:  Activity:  Goal: Risk for activity intolerance will decrease  Description: Risk for activity intolerance will decrease  Outcome: Met This Shift

## 2020-10-24 NOTE — PLAN OF CARE
Problem: Pain:  Goal: Pain level will decrease  Description: Pain level will decrease  10/24/2020 0945 by Lul Mcmillan RN  Outcome: Met This Shift  10/24/2020 0507 by Rei Ro RN  Outcome: Met This Shift  Goal: Control of acute pain  Description: Control of acute pain  Outcome: Met This Shift  Goal: Control of chronic pain  Description: Control of chronic pain  Outcome: Met This Shift     Problem: Activity:  Goal: Risk for activity intolerance will decrease  Description: Risk for activity intolerance will decrease  10/24/2020 0945 by Lul Mcmillan RN  Outcome: Met This Shift  10/24/2020 0507 by Rei Ro RN  Outcome: Met This Shift     Problem:  Bowel/Gastric:  Goal: Bowel function will improve  Description: Bowel function will improve  Outcome: Met This Shift  Goal: Diagnostic test results will improve  Description: Diagnostic test results will improve  Outcome: Met This Shift  Goal: Occurrences of nausea will decrease  Description: Occurrences of nausea will decrease  Outcome: Met This Shift  Goal: Occurrences of vomiting will decrease  Description: Occurrences of vomiting will decrease  Outcome: Met This Shift     Problem: Fluid Volume:  Goal: Maintenance of adequate hydration will improve  Description: Maintenance of adequate hydration will improve  Outcome: Met This Shift     Problem: Health Behavior:  Goal: Ability to state signs and symptoms to report to health care provider will improve  Description: Ability to state signs and symptoms to report to health care provider will improve  Outcome: Met This Shift     Problem: Physical Regulation:  Goal: Complications related to the disease process, condition or treatment will be avoided or minimized  Description: Complications related to the disease process, condition or treatment will be avoided or minimized  Outcome: Met This Shift  Goal: Ability to maintain clinical measurements within normal limits will improve  Description: Ability to maintain clinical measurements within normal limits will improve  Outcome: Met This Shift     Problem: Sensory:  Goal: Pain level will decrease  Description: Pain level will decrease  10/24/2020 0945 by Evelyn Wilburn RN  Outcome: Met This Shift  10/24/2020 0507 by Mac Rai RN  Outcome: Met This Shift  Goal: Ability to identify factors that increase the pain will improve  Description: Ability to identify factors that increase the pain will improve  Outcome: Met This Shift  Goal: Ability to notify healthcare provider of pain before it becomes unmanageable or unbearable will improve  Description: Ability to notify healthcare provider of pain before it becomes unmanageable or unbearable will improve  Outcome: Met This Shift

## 2020-10-24 NOTE — PROGRESS NOTES
General Surgery Progress Note    Subjective:   Chief complaint: Weight loss  The patient is feeling well. He is tolerating liquid diet. He states his bowels are formed. He denies any significant abdominal pain. Scheduled Meds:   ampicillin-sulbactam  3 g Intravenous Q6H    sodium chloride flush  10 mL Intravenous BID    buprenorphine-naloxone  0.5 Film Sublingual BID    sodium chloride flush  10 mL Intravenous 2 times per day     Continuous Infusions:  PRN Meds:sodium chloride flush, acetaminophen **OR** acetaminophen, promethazine **OR** ondansetron, magnesium sulfate, potassium chloride **OR** potassium alternative oral replacement **OR** potassium chloride    No Known Allergies    Objective:     /62   Pulse 76   Temp 98.1 °F (36.7 °C) (Oral)   Resp 18   Ht 6' 2\" (1.88 m)   Wt 159 lb 8 oz (72.3 kg)   SpO2 97%   BMI 20.48 kg/m²     Average, Min, and Max for last 24 hours Vitals:  TEMPERATURE:  Temp  Av.2 °F (36.8 °C)  Min: 98.1 °F (36.7 °C)  Max: 98.3 °F (36.8 °C)    RESPIRATIONS RANGE: Resp  Av  Min: 16  Max: 18    PULSE RANGE: Pulse  Av  Min: 68  Max: 76    BLOOD PRESSURE RANGE:  Systolic (23PYS), PJX:858 , Min:102 , KWB:974   ; Diastolic (03EDP), ARMIDA:15, Min:57, Max:62      PULSE OXIMETRY RANGE: SpO2  Av.5 %  Min: 97 %  Max: 98 %    I/O last 3 completed shifts:   In: 720 [P.O.:720]  Out: -     Lab Results   Component Value Date    WBC 5.9 10/24/2020    HGB 9.8 (L) 10/24/2020    HCT 32.7 (L) 10/24/2020    MCV 84.5 10/24/2020     (H) 10/24/2020     Lab Results   Component Value Date     10/24/2020    K 3.7 10/24/2020    K 4.4 10/22/2020     10/24/2020    CO2 27 10/24/2020    BUN 7 10/24/2020    CREATININE 0.7 10/24/2020    GLUCOSE 109 10/24/2020    CALCIUM 8.8 10/24/2020      Lab Results   Component Value Date     10/24/2020    K 3.7 10/24/2020     10/24/2020    CO2 27 10/24/2020    BUN 7 10/24/2020    CREATININE 0.7 10/24/2020    GLUCOSE 109 (H) 10/24/2020    CALCIUM 8.8 10/24/2020    PROT 6.5 10/24/2020    LABALBU 2.9 (L) 10/24/2020    BILITOT 0.3 10/24/2020    ALKPHOS 66 10/24/2020    AST 13 10/24/2020    ALT 8 10/24/2020    LABGLOM >60 10/24/2020    GFRAA >60 10/24/2020     Lab Results   Component Value Date    ALT 8 10/24/2020    AST 13 10/24/2020    ALKPHOS 66 10/24/2020    BILITOT 0.3 10/24/2020       FL SMALL BOWEL FOLLOW THROUGH ONLY   Final Result   Focal area of ulcerative change involving the distal ileum, which corresponds   to the area of inflammatory bowel changes seen on comparison CT imaging   adjacent to the known abscess collection. This would be consistent with   acute enteritis in this patient with history of Crohn's disease. Contrast   passes into the colon at 5 hours. Of note, there is also a focal area of   contrast seen overlying the L4 vertebral body on the 5 hour image, which is   nonspecific. Uncertain if this could be related to a small amount of   contrast within a bowel loop or if this could be related to a small fistula. Could consider repeat CT imaging without additional oral contrast   administration to determine whether there is oral contrast within the   patient's abscess collection. CT ABDOMEN PELVIS W IV CONTRAST Additional Contrast? Oral   Final Result   1. Compared to the CT of the abdomen and pelvis from yesterday, there is no   significant interval change. 2. Again noted is a large, ill-defined, multilocular abscess in the lower   abdominal and pelvic mesentery. An intramural abscess is seen in the wall of   the sigmoid colon. 3. Mural thickening and inflammatory changes are also seen in the wall of the   distal ileum, which traverses the site of the inflammation. There is mild   distension of the proximal small bowel loops, which may be due to ileus or   partial obstruction. Gaseous distention of the colon is also noted. 4. Grossly stable mesenteric lymphadenitis.    5. Findings of mild sacroiliitis, which may be enteropathic, related to   patient's history of Crohn's disease. Abdomen soft nondistended nontender        Extremeties:  No lower extremity edema. Assessment/Plan:   3 32-year-old male with Crohn's disease, interloop abscesses. Appreciate ID consult. Continue with IV antibiotics. No plans for percutaneous drainage at this point. Will advance his diet. If he tolerates and home-going antibiotics can be arranged, he may be discharged.       Electronically signed by Ya Duque DO on 10/24/20 at 11:00 AM EDT

## 2020-10-24 NOTE — PROGRESS NOTES
PROGRESS NOTE    Patient Presents with/Seen in Consultation For      *Crohn's disease, patient known to you  CHIEF COMPLAINT:  Abdominal pain    Subjective:     Patient states he feels good. Pt denies abd pain, n/v, or diarrhea. Pt had light brown stool this am, softly formed. Pt asking to eat. Review of Systems  Aside from what was mentioned in the PMH and HPI, essentially unremarkable, all others negative. Objective:     /62   Pulse 76   Temp 98.1 °F (36.7 °C) (Oral)   Resp 18   Ht 6' 2\" (1.88 m)   Wt 159 lb 8 oz (72.3 kg)   SpO2 97%   BMI 20.48 kg/m²     General appearance: alert, awake, laying in bed, and cooperative  Eyes: conjunctiva pale, sclera anicteric. PERRL.   Lungs: clear to auscultation bilaterally  Heart: regular rate and rhythm, no murmur, 2+ pulses; no edema  Abdomen: soft, non-tender; bowel sounds normal; no masses,  no organomegaly  Extremities: extremities without edema  Pulses: 2+ and symmetric  Skin: Skin color pale, texture, turgor normal.   Neurologic: Grossly normal    ampicillin-sulbactam (UNASYN) 3 g ivpb minibag, Q6H  sodium chloride flush 0.9 % injection 10 mL, BID  buprenorphine-naloxone (SUBOXONE) 8-2 MG SL film 0.5 Film, BID  sodium chloride flush 0.9 % injection 10 mL, 2 times per day  sodium chloride flush 0.9 % injection 10 mL, PRN  acetaminophen (TYLENOL) tablet 650 mg, Q6H PRN    Or  acetaminophen (TYLENOL) suppository 650 mg, Q6H PRN  promethazine (PHENERGAN) tablet 12.5 mg, Q6H PRN    Or  ondansetron (ZOFRAN) injection 4 mg, Q6H PRN  magnesium sulfate 2 g in 50 mL IVPB premix, PRN  potassium chloride (KLOR-CON M) extended release tablet 40 mEq, PRN    Or  potassium bicarb-citric acid (EFFER-K) effervescent tablet 40 mEq, PRN    Or  potassium chloride 10 mEq/100 mL IVPB (Peripheral Line), PRN         Data Review  CBC:   Lab Results   Component Value Date    WBC 5.9 10/24/2020    RBC 3.87 10/24/2020    HGB 9.8 10/24/2020    HCT 32.7 10/24/2020    MCV 84.5 10/24/2020    MCH 25.3 10/24/2020    MCHC 30.0 10/24/2020    RDW 18.0 10/24/2020     10/24/2020    MPV 8.9 10/24/2020     CMP:    Lab Results   Component Value Date     10/24/2020    K 3.7 10/24/2020    K 4.4 10/22/2020     10/24/2020    CO2 27 10/24/2020    BUN 7 10/24/2020    CREATININE 0.7 10/24/2020    GFRAA >60 10/24/2020    LABGLOM >60 10/24/2020    GLUCOSE 109 10/24/2020    PROT 6.5 10/24/2020    LABALBU 2.9 10/24/2020    CALCIUM 8.8 10/24/2020    BILITOT 0.3 10/24/2020    ALKPHOS 66 10/24/2020    AST 13 10/24/2020    ALT 8 10/24/2020     Hepatic Function Panel:    Lab Results   Component Value Date    ALKPHOS 66 10/24/2020    ALT 8 10/24/2020    AST 13 10/24/2020    PROT 6.5 10/24/2020    BILITOT 0.3 10/24/2020    LABALBU 2.9 10/24/2020       PT/INR:    Lab Results   Component Value Date    PROTIME 14.0 10/22/2020    INR 1.2 10/22/2020       Assessment:     Active Problems:  ? Crohn's disease with multiple abscesses, largest 9.2 x 7.2 cm  ? Abdominal pain, resolved  ? Nausea -resolved  ? Mesenteric Lymphadenitis  ? Anemia, normocytic, hypochromic  ? Significant thrombocytosis - defer to surgery  ? Hx Opioid abuse, currently on Suboxone - defer to surgery     Plan:     ? Questionable need for drainage - defer to surgery  ? Monitor CBC, CMP daily  ? Calprotectin stool    ? No steroids at this time  ? Unasyn per Id  ? Suboxone per surgery  ? Medicate for pain and nausea as ordered per surgery  ? Clear liquid diet, advance per surgery  ? Supportive care  ? Continue to monitor       Dr. Trev Rossi until [place] 10/25/2020, Dr Robbin Ramirez covering in his absence. Note: This report was completed utilizing computer voice recognition software. Every effort has been made to ensure accuracy, however; inadvertent computerized transcription errors may be present.      Discussed with Dr. Robbin Ramirez (covering for Dr Taylor Antoine)  Plan per Dr. Robbin Ramirez  (covering for Dr Taylor Antoine)  Armand Cavanaugh [id number], FNP-BC 10/24/2020 9:57 AM For Dr. Dara Au  (covering for Dr Renee Miranda)

## 2020-10-24 NOTE — PROGRESS NOTES
Comprehensive Nutrition Assessment    Type and Reason for Visit:  Initial, Positive Nutrition Screen    Nutrition Recommendations/Plan: Recommend modify diet to Low Fiber diet. Ensure HP TID    Nutrition Assessment:  Pt is nutritionally compromised d/t subjective 30# wt loss over past few months 2/2 crohn's disease w/ noted multiple abscesses. Recommend Low Fiber diet and start Ensure HP TID. Malnutrition Assessment:  Malnutrition Status: At risk for malnutrition (Comment)    Context:  Chronic Illness     Findings of the 6 clinical characteristics of malnutrition:  Energy Intake:  7 - 75% or less estimated energy requirements for 1 month or longer  Weight Loss:  Unable to assess(d/t lack of EMR wt hx)     Body Fat Loss:  Unable to assess     Muscle Mass Loss:  Unable to assess    Fluid Accumulation:  No significant fluid accumulation     Strength:  Not Performed    Estimated Daily Nutrient Needs:  Energy (kcal):  6593-5964; Weight Used for Energy Requirements:  Current     Protein (g):  ; Weight Used for Protein Requirements:  Current(1.2-1.4)        Fluid (ml/day):  9043-0680; Weight Used for Fluid Requirements:  Current      Nutrition Related Findings:  pt alert, active BS, soft abd, no edema noted, +I/Os      Wounds:  None       Current Nutrition Therapies:    DIET BARIATRIC SOFT;    Anthropometric Measures:  · Height: 6' 2\" (188 cm)  · Current Body Weight: 159 lb (72.1 kg)   · Admission Body Weight: 151 lb (68.5 kg)(10/21 bed)    · Usual Body Weight: 148 lb (67.1 kg)(stated wt 10/2020, no wt hx on file)     · Ideal Body Weight: 190 lbs; % Ideal Body Weight 83.7 %   · BMI: 20.4  · BMI Categories: Normal Weight (BMI 18.5-24. 9)       Nutrition Diagnosis:   · Inadequate oral intake related to altered GI function(crohn's disease) as evidenced by poor intake prior to admission    Nutrition Interventions:   Food and/or Nutrient Delivery:  Modify Current Diet, Start Oral Nutrition Supplement(Recommend Low Fiber diet, Ensure HP TID)  Nutrition Education/Counseling:  No recommendation at this time   Coordination of Nutrition Care:  Continued Inpatient Monitoring    Goals:  pt to consume >75% meals/ONS       Nutrition Monitoring and Evaluation:   Food/Nutrient Intake Outcomes:  Food and Nutrient Intake, Supplement Intake  Physical Signs/Symptoms Outcomes:  Biochemical Data, GI Status, Fluid Status or Edema, Nutrition Focused Physical Findings, Skin, Weight     Discharge Planning:     Too soon to determine     Electronically signed by Heavenly Monterroso, MS, RD, LD on 10/24/20 at 12:38 PM EDT    Contact: [phone]

## 2020-10-24 NOTE — PROGRESS NOTES
PROGRESS NOTE    Patient Presents with/Seen in Consultation For      *Crohn's disease, patient known to you  CHIEF COMPLAINT:  Abdominal pain    Subjective:     Patient sitting up in bed in no apparent distress, stating he feels good. Pt denies abd pain, n/v, or diarrhea. Tolerating diet. Pt had 1 loosey formed, brown BM yesterday. Review of Systems  Aside from what was mentioned in the PMH and HPI, essentially unremarkable, all others negative. Objective:     BP (!) 102/57   Pulse 91   Temp 98.4 °F (36.9 °C) (Oral)   Resp 18   Ht 6' 2\" (1.88 m)   Wt 159 lb (72.1 kg)   SpO2 95%   BMI 20.41 kg/m²     General appearance: alert, awake, sitting up in bed, and cooperative  Eyes: conjunctiva pale, sclera anicteric. PERRL.   Lungs: clear to auscultation bilaterally  Heart: regular rate and rhythm, no murmur, 2+ pulses; no edema  Abdomen: soft, non-tender; hyperactive bowel sounds normal; no masses,  no organomegaly  Extremities: extremities without edema  Pulses: 2+ and symmetric  Skin: Skin color pale, texture, turgor normal.   Neurologic: Grossly normal    lidocaine PF 1 % injection 5 mL, Once  sodium chloride flush 0.9 % injection 10 mL, PRN  heparin flush 100 UNIT/ML injection 300 Units, 2 times per day  heparin flush 100 UNIT/ML injection 300 Units, PRN  ampicillin-sulbactam (UNASYN) 3 g ivpb minibag, Q6H  sodium chloride flush 0.9 % injection 10 mL, BID  buprenorphine-naloxone (SUBOXONE) 8-2 MG SL film 0.5 Film, BID  sodium chloride flush 0.9 % injection 10 mL, 2 times per day  sodium chloride flush 0.9 % injection 10 mL, PRN  acetaminophen (TYLENOL) tablet 650 mg, Q6H PRN    Or  acetaminophen (TYLENOL) suppository 650 mg, Q6H PRN  promethazine (PHENERGAN) tablet 12.5 mg, Q6H PRN    Or  ondansetron (ZOFRAN) injection 4 mg, Q6H PRN  magnesium sulfate 2 g in 50 mL IVPB premix, PRN  potassium chloride (KLOR-CON M) extended release tablet 40 mEq, PRN    Or  potassium bicarb-citric acid (EFFER-K) effervescent tablet 40 mEq, PRN    Or  potassium chloride 10 mEq/100 mL IVPB (Peripheral Line), PRN         Data Review  CBC:   Lab Results   Component Value Date    WBC 5.9 10/24/2020    RBC 3.87 10/24/2020    HGB 9.8 10/24/2020    HCT 32.7 10/24/2020    MCV 84.5 10/24/2020    MCH 25.3 10/24/2020    MCHC 30.0 10/24/2020    RDW 18.0 10/24/2020     10/24/2020    MPV 8.9 10/24/2020     CMP:    Lab Results   Component Value Date     10/24/2020    K 3.7 10/24/2020    K 4.4 10/22/2020     10/24/2020    CO2 27 10/24/2020    BUN 7 10/24/2020    CREATININE 0.7 10/24/2020    GFRAA >60 10/24/2020    LABGLOM >60 10/24/2020    GLUCOSE 109 10/24/2020    PROT 6.5 10/24/2020    LABALBU 2.9 10/24/2020    CALCIUM 8.8 10/24/2020    BILITOT 0.3 10/24/2020    ALKPHOS 66 10/24/2020    AST 13 10/24/2020    ALT 8 10/24/2020     Hepatic Function Panel:    Lab Results   Component Value Date    ALKPHOS 66 10/24/2020    ALT 8 10/24/2020    AST 13 10/24/2020    PROT 6.5 10/24/2020    BILITOT 0.3 10/24/2020    LABALBU 2.9 10/24/2020        PT/INR:    Lab Results   Component Value Date    PROTIME 14.0 10/22/2020    INR 1.2 10/22/2020       Assessment:     Active Problems:  ? Crohn's disease with multiple abscesses, largest 9.2 x 7.2 cm  ? Abdominal pain, resolved  ? Nausea -resolved  ? Mesenteric Lymphadenitis  ? Anemia, normocytic, hypochromic  ? Significant thrombocytosis - defer to surgery  ? Hx Opioid abuse, currently on Suboxone - defer to [facility]:     ? No plans for abscess drainage per surgery  ? SBFT- results noted  ? CT Abd/pelvis W IV Contrast to assess barium in the abscess per Rad recommendations  ? Monitor CBC, CMP daily  ? Calprotectin stool pending  ? No steroids at this time  ? Unasyn per Id  ? Suboxone per surgery  ? Medicate for pain and nausea as ordered per surgery  ? Diet bariatric soft- per surgery  ? Supportive care  ? Will d/w Dr Gage Section upon his return for further recommendations  ?  Continue to monitor     Dr. Erick Alexandre until today 10/25/2020, Dr Dara Au covering in his absence. Note: This report was completed utilizing computer voice recognition software. Every effort has been made to ensure accuracy, however; inadvertent computerized transcription errors may be present.      Discussed with Dr. Dara Au (covering for Dr Renee iMranda)  Plan per Dr. Dara Au (Covering for Dr Renee Miranda)  [facility] APRN-10/25/2020 8:43 AM For Dr. Dara Au (Covering for Dr Renee Miranda)

## 2020-10-25 ENCOUNTER — APPOINTMENT (OUTPATIENT)
Dept: CT IMAGING | Age: 50
DRG: 385 | End: 2020-10-25
Payer: COMMERCIAL

## 2020-10-25 LAB
ALBUMIN SERPL-MCNC: 2.6 G/DL (ref 3.5–5.2)
ALP BLD-CCNC: 56 U/L (ref 40–129)
ALT SERPL-CCNC: 6 U/L (ref 0–40)
ANION GAP SERPL CALCULATED.3IONS-SCNC: 9 MMOL/L (ref 7–16)
AST SERPL-CCNC: 8 U/L (ref 0–39)
BASOPHILS ABSOLUTE: 0.03 E9/L (ref 0–0.2)
BASOPHILS RELATIVE PERCENT: 0.4 % (ref 0–2)
BILIRUB SERPL-MCNC: 0.4 MG/DL (ref 0–1.2)
BUN BLDV-MCNC: 6 MG/DL (ref 6–20)
C-REACTIVE PROTEIN: 17.6 MG/DL (ref 0–0.4)
CALCIUM SERPL-MCNC: 8.3 MG/DL (ref 8.6–10.2)
CHLORIDE BLD-SCNC: 99 MMOL/L (ref 98–107)
CO2: 26 MMOL/L (ref 22–29)
CREAT SERPL-MCNC: 0.7 MG/DL (ref 0.7–1.2)
EOSINOPHILS ABSOLUTE: 0.03 E9/L (ref 0.05–0.5)
EOSINOPHILS RELATIVE PERCENT: 0.4 % (ref 0–6)
FERRITIN: 125 NG/ML
GFR AFRICAN AMERICAN: >60
GFR NON-AFRICAN AMERICAN: >60 ML/MIN/1.73
GLUCOSE BLD-MCNC: 98 MG/DL (ref 74–99)
HCT VFR BLD CALC: 28 % (ref 37–54)
HEMOGLOBIN: 8.9 G/DL (ref 12.5–16.5)
IMMATURE GRANULOCYTES #: 0.04 E9/L
IMMATURE GRANULOCYTES %: 0.5 % (ref 0–5)
IRON SATURATION: 4 % (ref 20–55)
IRON: 8 MCG/DL (ref 59–158)
LYMPHOCYTES ABSOLUTE: 1.08 E9/L (ref 1.5–4)
LYMPHOCYTES RELATIVE PERCENT: 13.4 % (ref 20–42)
MCH RBC QN AUTO: 26.1 PG (ref 26–35)
MCHC RBC AUTO-ENTMCNC: 31.8 % (ref 32–34.5)
MCV RBC AUTO: 82.1 FL (ref 80–99.9)
MONOCYTES ABSOLUTE: 0.48 E9/L (ref 0.1–0.95)
MONOCYTES RELATIVE PERCENT: 6 % (ref 2–12)
NEUTROPHILS ABSOLUTE: 6.39 E9/L (ref 1.8–7.3)
NEUTROPHILS RELATIVE PERCENT: 79.3 % (ref 43–80)
PDW BLD-RTO: 18.4 FL (ref 11.5–15)
PLATELET # BLD: 691 E9/L (ref 130–450)
PMV BLD AUTO: 9 FL (ref 7–12)
POTASSIUM SERPL-SCNC: 3.9 MMOL/L (ref 3.5–5)
RBC # BLD: 3.41 E12/L (ref 3.8–5.8)
SEDIMENTATION RATE, ERYTHROCYTE: 41 MM/HR (ref 0–15)
SODIUM BLD-SCNC: 134 MMOL/L (ref 132–146)
TOTAL IRON BINDING CAPACITY: 196 MCG/DL (ref 250–450)
TOTAL PROTEIN: 5.7 G/DL (ref 6.4–8.3)
WBC # BLD: 8.1 E9/L (ref 4.5–11.5)

## 2020-10-25 PROCEDURE — 6360000002 HC RX W HCPCS: Performed by: SPECIALIST

## 2020-10-25 PROCEDURE — 2500000003 HC RX 250 WO HCPCS: Performed by: SPECIALIST

## 2020-10-25 PROCEDURE — 85025 COMPLETE CBC W/AUTO DIFF WBC: CPT

## 2020-10-25 PROCEDURE — 82728 ASSAY OF FERRITIN: CPT

## 2020-10-25 PROCEDURE — 2580000003 HC RX 258: Performed by: SPECIALIST

## 2020-10-25 PROCEDURE — 2580000003 HC RX 258: Performed by: SURGERY

## 2020-10-25 PROCEDURE — 6360000004 HC RX CONTRAST MEDICATION: Performed by: RADIOLOGY

## 2020-10-25 PROCEDURE — 83550 IRON BINDING TEST: CPT

## 2020-10-25 PROCEDURE — 83540 ASSAY OF IRON: CPT

## 2020-10-25 PROCEDURE — 02HV33Z INSERTION OF INFUSION DEVICE INTO SUPERIOR VENA CAVA, PERCUTANEOUS APPROACH: ICD-10-PCS | Performed by: SPECIALIST

## 2020-10-25 PROCEDURE — 76937 US GUIDE VASCULAR ACCESS: CPT

## 2020-10-25 PROCEDURE — 74177 CT ABD & PELVIS W/CONTRAST: CPT

## 2020-10-25 PROCEDURE — 86140 C-REACTIVE PROTEIN: CPT

## 2020-10-25 PROCEDURE — 1200000000 HC SEMI PRIVATE

## 2020-10-25 PROCEDURE — 36569 INSJ PICC 5 YR+ W/O IMAGING: CPT

## 2020-10-25 PROCEDURE — 99233 SBSQ HOSP IP/OBS HIGH 50: CPT | Performed by: SURGERY

## 2020-10-25 PROCEDURE — 36415 COLL VENOUS BLD VENIPUNCTURE: CPT

## 2020-10-25 PROCEDURE — 85651 RBC SED RATE NONAUTOMATED: CPT

## 2020-10-25 PROCEDURE — 80053 COMPREHEN METABOLIC PANEL: CPT

## 2020-10-25 PROCEDURE — C1751 CATH, INF, PER/CENT/MIDLINE: HCPCS

## 2020-10-25 RX ADMIN — SODIUM CHLORIDE 3 G: 900 INJECTION INTRAVENOUS at 11:35

## 2020-10-25 RX ADMIN — Medication 10 ML: at 08:09

## 2020-10-25 RX ADMIN — SODIUM CHLORIDE 3 G: 900 INJECTION INTRAVENOUS at 22:30

## 2020-10-25 RX ADMIN — SODIUM CHLORIDE 3 G: 900 INJECTION INTRAVENOUS at 05:00

## 2020-10-25 RX ADMIN — BUPRENORPHINE AND NALOXONE 0.5 FILM: 8; 2 FILM, SOLUBLE BUCCAL; SUBLINGUAL at 08:08

## 2020-10-25 RX ADMIN — Medication 10 ML: at 21:00

## 2020-10-25 RX ADMIN — SODIUM CHLORIDE 3 G: 900 INJECTION INTRAVENOUS at 17:13

## 2020-10-25 RX ADMIN — LIDOCAINE HYDROCHLORIDE 0.5 ML: 10 INJECTION, SOLUTION EPIDURAL; INFILTRATION; INTRACAUDAL; PERINEURAL at 13:39

## 2020-10-25 RX ADMIN — IOPAMIDOL 75 ML: 755 INJECTION, SOLUTION INTRAVENOUS at 11:59

## 2020-10-25 RX ADMIN — Medication 300 UNITS: at 21:00

## 2020-10-25 RX ADMIN — BUPRENORPHINE AND NALOXONE 0.5 FILM: 8; 2 FILM, SOLUBLE BUCCAL; SUBLINGUAL at 21:00

## 2020-10-25 ASSESSMENT — PAIN SCALES - GENERAL: PAINLEVEL_OUTOF10: 0

## 2020-10-25 NOTE — PLAN OF CARE
Problem: Pain:  Goal: Pain level will decrease  Description: Pain level will decrease  Outcome: Met This Shift  Goal: Control of acute pain  Description: Control of acute pain  Outcome: Met This Shift  Goal: Control of chronic pain  Description: Control of chronic pain  Outcome: Met This Shift     Problem: Activity:  Goal: Risk for activity intolerance will decrease  Description: Risk for activity intolerance will decrease  Outcome: Met This Shift     Problem:  Bowel/Gastric:  Goal: Bowel function will improve  Description: Bowel function will improve  Outcome: Met This Shift  Goal: Diagnostic test results will improve  Description: Diagnostic test results will improve  Outcome: Met This Shift  Goal: Occurrences of nausea will decrease  Description: Occurrences of nausea will decrease  Outcome: Met This Shift  Goal: Occurrences of vomiting will decrease  Description: Occurrences of vomiting will decrease  Outcome: Met This Shift     Problem: Fluid Volume:  Goal: Maintenance of adequate hydration will improve  Description: Maintenance of adequate hydration will improve  Outcome: Met This Shift     Problem: Health Behavior:  Goal: Ability to state signs and symptoms to report to health care provider will improve  Description: Ability to state signs and symptoms to report to health care provider will improve  Outcome: Met This Shift     Problem: Physical Regulation:  Goal: Complications related to the disease process, condition or treatment will be avoided or minimized  Description: Complications related to the disease process, condition or treatment will be avoided or minimized  Outcome: Met This Shift  Goal: Ability to maintain clinical measurements within normal limits will improve  Description: Ability to maintain clinical measurements within normal limits will improve  Outcome: Met This Shift     Problem: Sensory:  Goal: Pain level will decrease  Description: Pain level will decrease  Outcome: Met This

## 2020-10-25 NOTE — PROGRESS NOTES
Called and spoke with Herminia Jackson the pharmacist. Updated and asked him re  patient requesting not to waste half of the suboxin film. He agreed that the half film should not be wasted. He's only concern is that the half dose film should be protected from moisture. He agrees to wear gloves and not to touch the film with hands to prevent moisture.

## 2020-10-25 NOTE — PROGRESS NOTES
32.7 (L) 10/24/2020    MCV 84.5 10/24/2020     (H) 10/24/2020     Lab Results   Component Value Date    NEUTROABS 4.31 10/24/2020    NEUTROABS 6.11 10/23/2020    NEUTROABS 4.40 10/22/2020     No results found for: CRP  Lab Results   Component Value Date    ALT 8 10/24/2020    AST 13 10/24/2020    ALKPHOS 66 10/24/2020    BILITOT 0.3 10/24/2020     Lab Results   Component Value Date     10/24/2020    K 3.7 10/24/2020    K 4.4 10/22/2020     10/24/2020    CO2 27 10/24/2020    BUN 7 10/24/2020    CREATININE 0.7 10/24/2020    CREATININE 0.7 10/23/2020    CREATININE 0.7 10/22/2020    GFRAA >60 10/24/2020    LABGLOM >60 10/24/2020    GLUCOSE 109 10/24/2020    PROT 6.5 10/24/2020    LABALBU 2.9 10/24/2020    CALCIUM 8.8 10/24/2020    BILITOT 0.3 10/24/2020    ALKPHOS 66 10/24/2020    AST 13 10/24/2020    ALT 8 10/24/2020     Lab Results   Component Value Date    CRP 11.7 (H) 10/23/2020     Lab Results   Component Value Date    SEDRATE 62 (H) 10/23/2020     Radiology:  CT of the abdomen and pelvis reviewed    Microbiology:   Blood cultures were ordered but canceled    ASSESSMENT:  · Crohn's flareup  · Multiple intra-abdominal abscesses  · Small bowel fistula    PLAN:  · Continue empiric Unasyn  · Surgery has no plans for drainage    Keith Fatima  11:41 AM  10/25/2020

## 2020-10-25 NOTE — PROGRESS NOTES
Patient refused blood draw. He said he is a hard stick and he was stuck multiple times in er. He wants blood draw done with PICC line insertion.

## 2020-10-25 NOTE — PROCEDURES
PICC    Catheter insertion date 10/25/2020     Product Number:  Ref [id number]   Lot No: [id number]   Gauge: 18   Lumen: single,4FR   Lt brachial vein    Vein Diameter : 0.42cm   Upper arm circumference (10CM ABOVE AC): 25cm   Catheter Length : 46cm   Internal Length: 44cm   External Catheter Length: 2cm   Ultrasound Used:yes  [facility] [employer] confirms PICC tip is placed in the lower 1/3 of the SVC or at the Cavoatrial junction. Floor nurse notified PICC is okay to use.    : RENE Easton RN  Assistant: JESUS Vela-BC

## 2020-10-25 NOTE — PROGRESS NOTES
General Surgery Progress Note    Subjective:   Chief complaint: Weight loss  The patient is feeling well. He was tolerating his soft diet. He denies abdominal pain. Bowels are functioning normally. No diarrhea. No rectal bleeding. Scheduled Meds:   lidocaine PF  5 mL Intradermal Once    heparin flush  3 mL Intravenous 2 times per day    ampicillin-sulbactam  3 g Intravenous Q6H    sodium chloride flush  10 mL Intravenous BID    buprenorphine-naloxone  0.5 Film Sublingual BID    sodium chloride flush  10 mL Intravenous 2 times per day     Continuous Infusions:  PRN Meds:sodium chloride flush, heparin flush, sodium chloride flush, acetaminophen **OR** acetaminophen, promethazine **OR** ondansetron, magnesium sulfate, potassium chloride **OR** potassium alternative oral replacement **OR** potassium chloride    No Known Allergies    Objective:     BP (!) 102/57   Pulse 91   Temp 98.4 °F (36.9 °C) (Oral)   Resp 18   Ht 6' 2\" (1.88 m)   Wt 159 lb (72.1 kg)   SpO2 95%   BMI 20.41 kg/m²     Average, Min, and Max for last 24 hours Vitals:  TEMPERATURE:  Temp  Av.1 °F (36.7 °C)  Min: 97.8 °F (36.6 °C)  Max: 98.4 °F (36.9 °C)    RESPIRATIONS RANGE: Resp  Av  Min: 18  Max: 18    PULSE RANGE: Pulse  Av  Min: 81  Max: 91    BLOOD PRESSURE RANGE:  Systolic (79BXD), PFH:886 , Min:102 , AJH:415   ; Diastolic (07HZR), DCF:74, Min:57, Max:66      PULSE OXIMETRY RANGE: SpO2  Av %  Min: 95 %  Max: 97 %    I/O last 3 completed shifts:   In: 550 [P.O.:540; I.V.:10]  Out: -     Lab Results   Component Value Date    WBC 5.9 10/24/2020    HGB 9.8 (L) 10/24/2020    HCT 32.7 (L) 10/24/2020    MCV 84.5 10/24/2020     (H) 10/24/2020     Lab Results   Component Value Date     10/24/2020    K 3.7 10/24/2020    K 4.4 10/22/2020     10/24/2020    CO2 27 10/24/2020    BUN 7 10/24/2020    CREATININE 0.7 10/24/2020    GLUCOSE 109 10/24/2020    CALCIUM 8.8 10/24/2020      Lab Results   Component due to ileus or   partial obstruction. Gaseous distention of the colon is also noted. 4. Grossly stable mesenteric lymphadenitis. 5. Findings of mild sacroiliitis, which may be enteropathic, related to   patient's history of Crohn's disease. CT ABDOMEN PELVIS W WO CONTRAST Additional Contrast? None    (Results Pending)     Abdomen soft nondistended nontender throughout        Extremeties:  No lower extremity edema. Assessment/Plan:   3 77-year-old male with Crohn's disease, intraloop abscesses. Responding well to current management. CT has been ordered by GI service. Appreciate GI recommendations and management.       Electronically signed by Baljit Floyd DO on 10/25/20 at 10:43 AM EDT

## 2020-10-26 VITALS
WEIGHT: 160 LBS | SYSTOLIC BLOOD PRESSURE: 98 MMHG | DIASTOLIC BLOOD PRESSURE: 61 MMHG | RESPIRATION RATE: 14 BRPM | HEART RATE: 84 BPM | HEIGHT: 74 IN | TEMPERATURE: 97.8 F | BODY MASS INDEX: 20.53 KG/M2 | OXYGEN SATURATION: 99 %

## 2020-10-26 LAB
ALBUMIN SERPL-MCNC: 2.4 G/DL (ref 3.5–5.2)
ALP BLD-CCNC: 55 U/L (ref 40–129)
ALT SERPL-CCNC: 6 U/L (ref 0–40)
ANION GAP SERPL CALCULATED.3IONS-SCNC: 9 MMOL/L (ref 7–16)
AST SERPL-CCNC: 8 U/L (ref 0–39)
BASOPHILS ABSOLUTE: 0.03 E9/L (ref 0–0.2)
BASOPHILS RELATIVE PERCENT: 0.5 % (ref 0–2)
BILIRUB SERPL-MCNC: 0.4 MG/DL (ref 0–1.2)
BUN BLDV-MCNC: 9 MG/DL (ref 6–20)
CALCIUM SERPL-MCNC: 8.3 MG/DL (ref 8.6–10.2)
CHLORIDE BLD-SCNC: 99 MMOL/L (ref 98–107)
CO2: 26 MMOL/L (ref 22–29)
CREAT SERPL-MCNC: 0.7 MG/DL (ref 0.7–1.2)
EOSINOPHILS ABSOLUTE: 0.11 E9/L (ref 0.05–0.5)
EOSINOPHILS RELATIVE PERCENT: 1.8 % (ref 0–6)
GFR AFRICAN AMERICAN: >60
GFR NON-AFRICAN AMERICAN: >60 ML/MIN/1.73
GLUCOSE BLD-MCNC: 138 MG/DL (ref 74–99)
HCT VFR BLD CALC: 27.5 % (ref 37–54)
HEMOGLOBIN: 8.4 G/DL (ref 12.5–16.5)
IMMATURE GRANULOCYTES #: 0.03 E9/L
IMMATURE GRANULOCYTES %: 0.5 % (ref 0–5)
LYMPHOCYTES ABSOLUTE: 1.54 E9/L (ref 1.5–4)
LYMPHOCYTES RELATIVE PERCENT: 24.7 % (ref 20–42)
MCH RBC QN AUTO: 25.5 PG (ref 26–35)
MCHC RBC AUTO-ENTMCNC: 30.5 % (ref 32–34.5)
MCV RBC AUTO: 83.6 FL (ref 80–99.9)
MONOCYTES ABSOLUTE: 0.45 E9/L (ref 0.1–0.95)
MONOCYTES RELATIVE PERCENT: 7.2 % (ref 2–12)
NEUTROPHILS ABSOLUTE: 4.07 E9/L (ref 1.8–7.3)
NEUTROPHILS RELATIVE PERCENT: 65.3 % (ref 43–80)
PDW BLD-RTO: 18.1 FL (ref 11.5–15)
PLATELET # BLD: 579 E9/L (ref 130–450)
PMV BLD AUTO: 8.9 FL (ref 7–12)
POTASSIUM SERPL-SCNC: 3.8 MMOL/L (ref 3.5–5)
RBC # BLD: 3.29 E12/L (ref 3.8–5.8)
SODIUM BLD-SCNC: 134 MMOL/L (ref 132–146)
TOTAL PROTEIN: 5.7 G/DL (ref 6.4–8.3)
WBC # BLD: 6.2 E9/L (ref 4.5–11.5)

## 2020-10-26 PROCEDURE — 2580000003 HC RX 258: Performed by: SURGERY

## 2020-10-26 PROCEDURE — 80053 COMPREHEN METABOLIC PANEL: CPT

## 2020-10-26 PROCEDURE — 6360000002 HC RX W HCPCS: Performed by: SPECIALIST

## 2020-10-26 PROCEDURE — 6370000000 HC RX 637 (ALT 250 FOR IP): Performed by: STUDENT IN AN ORGANIZED HEALTH CARE EDUCATION/TRAINING PROGRAM

## 2020-10-26 PROCEDURE — 99232 SBSQ HOSP IP/OBS MODERATE 35: CPT | Performed by: SURGERY

## 2020-10-26 PROCEDURE — 2580000003 HC RX 258: Performed by: SPECIALIST

## 2020-10-26 PROCEDURE — 85025 COMPLETE CBC W/AUTO DIFF WBC: CPT

## 2020-10-26 PROCEDURE — 36415 COLL VENOUS BLD VENIPUNCTURE: CPT

## 2020-10-26 RX ORDER — DICYCLOMINE HYDROCHLORIDE 10 MG/1
10 CAPSULE ORAL 4 TIMES DAILY
Status: DISCONTINUED | OUTPATIENT
Start: 2020-10-26 | End: 2020-10-26 | Stop reason: HOSPADM

## 2020-10-26 RX ORDER — DICYCLOMINE HYDROCHLORIDE 10 MG/1
10 CAPSULE ORAL 4 TIMES DAILY
Qty: 120 CAPSULE | Refills: 3 | Status: SHIPPED | OUTPATIENT
Start: 2020-10-26

## 2020-10-26 RX ORDER — AMPICILLIN AND SULBACTAM 2; 1 G/1; G/1
3 INJECTION, POWDER, FOR SOLUTION INTRAMUSCULAR; INTRAVENOUS EVERY 6 HOURS
Qty: 252 G | Refills: 0 | Status: SHIPPED | OUTPATIENT
Start: 2020-10-26 | End: 2020-11-16

## 2020-10-26 RX ADMIN — DICYCLOMINE HYDROCHLORIDE 10 MG: 10 CAPSULE ORAL at 09:49

## 2020-10-26 RX ADMIN — SODIUM CHLORIDE 3 G: 900 INJECTION INTRAVENOUS at 11:43

## 2020-10-26 RX ADMIN — BUPRENORPHINE AND NALOXONE 0.5 FILM: 8; 2 FILM, SOLUBLE BUCCAL; SUBLINGUAL at 09:49

## 2020-10-26 RX ADMIN — DICYCLOMINE HYDROCHLORIDE 10 MG: 10 CAPSULE ORAL at 14:00

## 2020-10-26 RX ADMIN — Medication 300 UNITS: at 09:48

## 2020-10-26 RX ADMIN — SODIUM CHLORIDE 3 G: 900 INJECTION INTRAVENOUS at 05:30

## 2020-10-26 RX ADMIN — SODIUM CHLORIDE, PRESERVATIVE FREE 10 ML: 5 INJECTION INTRAVENOUS at 11:43

## 2020-10-26 RX ADMIN — Medication 10 ML: at 09:48

## 2020-10-26 ASSESSMENT — PAIN SCALES - GENERAL
PAINLEVEL_OUTOF10: 0
PAINLEVEL_OUTOF10: 0

## 2020-10-26 NOTE — PROGRESS NOTES
General Surgery Progress Note    Subjective:   Chief complaint: Weight loss  The patient is feeling well. He is tolerating diet but says he doesn't have much of an appetite. He denies any significant abdominal pain. Scheduled Meds:   dicyclomine  10 mg Oral 4x Daily    heparin flush  3 mL Intravenous 2 times per day    ampicillin-sulbactam  3 g Intravenous Q6H    sodium chloride flush  10 mL Intravenous BID    buprenorphine-naloxone  0.5 Film Sublingual BID    sodium chloride flush  10 mL Intravenous 2 times per day     Continuous Infusions:  PRN Meds:sodium chloride flush, heparin flush, sodium chloride flush, acetaminophen **OR** acetaminophen, promethazine **OR** ondansetron, magnesium sulfate, potassium chloride **OR** potassium alternative oral replacement **OR** potassium chloride    No Known Allergies    Objective:     BP (!) 116/58   Pulse 80   Temp 99.5 °F (37.5 °C) (Oral)   Resp 16   Ht 6' 2\" (1.88 m)   Wt 160 lb (72.6 kg)   SpO2 96%   BMI 20.54 kg/m²     Average, Min, and Max for last 24 hours Vitals:  TEMPERATURE:  Temp  Av.2 °F (37.3 °C)  Min: 98.8 °F (37.1 °C)  Max: 99.5 °F (37.5 °C)    RESPIRATIONS RANGE: Resp  Av.5  Min: 16  Max: 17    PULSE RANGE: Pulse  Av  Min: 80  Max: 82    BLOOD PRESSURE RANGE:  Systolic (97COQ), MJP:986 , Min:116 , IIW:163   ; Diastolic (21EEK), QVU:08, Min:58, Max:79      PULSE OXIMETRY RANGE: SpO2  Av %  Min: 96 %  Max: 96 %    I/O last 3 completed shifts:   In: 673 [P.O.:660; I.V.:13]  Out: -     Lab Results   Component Value Date    WBC 6.2 10/26/2020    HGB 8.4 (L) 10/26/2020    HCT 27.5 (L) 10/26/2020    MCV 83.6 10/26/2020     (H) 10/26/2020     Lab Results   Component Value Date     10/26/2020    K 3.8 10/26/2020    K 4.4 10/22/2020    CL 99 10/26/2020    CO2 26 10/26/2020    BUN 9 10/26/2020    CREATININE 0.7 10/26/2020    GLUCOSE 138 10/26/2020    CALCIUM 8.3 10/26/2020      Lab Results   Component Value Date    NA 134 10/26/2020    K 3.8 10/26/2020    CL 99 10/26/2020    CO2 26 10/26/2020    BUN 9 10/26/2020    CREATININE 0.7 10/26/2020    GLUCOSE 138 (H) 10/26/2020    CALCIUM 8.3 (L) 10/26/2020    PROT 5.7 (L) 10/26/2020    LABALBU 2.4 (L) 10/26/2020    BILITOT 0.4 10/26/2020    ALKPHOS 55 10/26/2020    AST 8 10/26/2020    ALT 6 10/26/2020    LABGLOM >60 10/26/2020    GFRAA >60 10/26/2020     Lab Results   Component Value Date    ALT 6 10/26/2020    AST 8 10/26/2020    ALKPHOS 55 10/26/2020    BILITOT 0.4 10/26/2020       CT ABDOMEN PELVIS W IV CONTRAST Additional Contrast? None   Final Result   1. There is communication between the gastrointestinal tract and the   septated, irregular collection in the pelvis that is presumably an abscess. This could represent a fistula from the adjacent inflamed ileum. 2.   Residual contrast in the colon, particularly the sigmoid colon causes   extensive reconstruction artifact and limits evaluation. 3.  There is some mesenteric lymphadenopathy. 4.  There is a mild sign of ileus. FL SMALL BOWEL FOLLOW THROUGH ONLY   Final Result   Focal area of ulcerative change involving the distal ileum, which corresponds   to the area of inflammatory bowel changes seen on comparison CT imaging   adjacent to the known abscess collection. This would be consistent with   acute enteritis in this patient with history of Crohn's disease. Contrast   passes into the colon at 5 hours. Of note, there is also a focal area of   contrast seen overlying the L4 vertebral body on the 5 hour image, which is   nonspecific. Uncertain if this could be related to a small amount of   contrast within a bowel loop or if this could be related to a small fistula. Could consider repeat CT imaging without additional oral contrast   administration to determine whether there is oral contrast within the   patient's abscess collection.          CT ABDOMEN PELVIS W IV CONTRAST Additional Contrast? Oral   Final Result   1. Compared to the CT of the abdomen and pelvis from yesterday, there is no   significant interval change. 2. Again noted is a large, ill-defined, multilocular abscess in the lower   abdominal and pelvic mesentery. An intramural abscess is seen in the wall of   the sigmoid colon. 3. Mural thickening and inflammatory changes are also seen in the wall of the   distal ileum, which traverses the site of the inflammation. There is mild   distension of the proximal small bowel loops, which may be due to ileus or   partial obstruction. Gaseous distention of the colon is also noted. 4. Grossly stable mesenteric lymphadenitis. 5. Findings of mild sacroiliitis, which may be enteropathic, related to   patient's history of Crohn's disease. Abdomen soft nondistended nontender        Extremeties:  No lower extremity edema. Assessment/Plan:   68-year-old male with Crohn's disease, interloop abscesses. ID consulted for IV antibiotics. Has PICC line now. Complaining of cramps this AM. Will prescribe bentyl this morning. Awaiting GI and ID input. If continues to tolerate diet today then ok to discharge later today. - dispo later today if tolerating antibiotics and ok from GI/ID      Azar Roth  Attending Physician Statement:  I have examined the patient, reviewed the record, and discussed the case with the surgical team.  I agree with the assessment and plan with the following additions, corrections, and changes. Feels well. Tolerating diet. Okay for discharge today. No plans for drainage at this point as patient is doing well clinically. Appreciate ID service. Butch Ledesma, , FACS    NOTE: This report was transcribed using voice recognition software. Every effort was made to ensure accuracy; however, inadvertent computerized transcription errors may be present.

## 2020-10-26 NOTE — CARE COORDINATION
Social Work discharge 1 [facility] Lilly spoke to pt about discharge planning. He lives with \"vance\" and said she will assist with learning home iv atb. Sw offered [facility] chocie list. Pt chose [facility] per ID's preference. Shannon called to Arely with [facility] ph [phone] and faxed them pt's signed Rx and Picc info in folder to fax [phone]. AWAIT TO SEE IF [facility] CAN ACCEPT OR NOT. Electronically signed by Jennifer Pizarro on 10/26/2020 at 12:29 PM     Addendum-    RICK called Anna Carty back at [facility], who advised they can accept pt and can see him at home tonight for his 6p dose. Rick notified charge JURGEN Harrison and pt's JURGEN Wayne.   Electronically signed by Jennifer Pizarro on 10/26/2020 at 2:33 PM

## 2020-10-26 NOTE — DISCHARGE SUMMARY
Physician Discharge Summary     Patient ID:  Flaca Scott  [id number]  52 y.o.  1970    Admit date: 10/21/2020    Discharge date and time: No discharge date for patient encounter. Admitting Physician: Zohaib Craft MD     Admission Diagnoses: [facility] [K65.1]  Intra-abdominal abscess [facility]) [K65.1]    Discharge Diagnoses: Active Problems:    Intra-abdominal abscess (HCC)    Crohn's disease of both small and large intestine with fistula ([facility]  Resolved Problems:    * No resolved hospital problems. *      Admission Condition: fair    Discharged Condition: stable    Indication for Admission: Interloop abscesses with Crohn's [address] [facility] Course/Procedures/Operation/treatments:   On 10/23 patient was seen in [facility] and noted to have interloop abscesses around the distal colon. He has a past medical history of Crohn's disease use transferred to [facility] for further evaluation by the GI doctors and Dr. Effie Silva. ID was consulted for antibiotic management. He progressed clinically was tolerating clear liquids and his diet was advanced on 10/24. He tolerated general diet. Plan is for discharge home with antibiotics once cleared with ID and GI. Consults:   IP CONSULT TO GENERAL SURGERY  IP CONSULT TO GI  IP CONSULT TO INFECTIOUS DISEASES  IP CONSULT TO IV TEAM    Significant Diagnostic Studies:   Ct Abdomen Pelvis W Iv Contrast Additional Contrast? Oral    Result Date: 10/22/2020  EXAMINATION: CT OF THE ABDOMEN AND PELVIS WITH CONTRAST 10/22/2020 11:08 am TECHNIQUE: CT of the abdomen and pelvis was performed with the administration of intravenous contrast. Multiplanar reformatted images are provided for review. Dose modulation, iterative reconstruction, and/or weight based adjustment of the mA/kV was utilized to reduce the radiation dose to as low as reasonably achievable.  COMPARISON: CT of the abdomen and pelvis, 10/21/2020 HISTORY: ORDERING SYSTEM PROVIDED HISTORY: crohn's diasease, abscess TECHNOLOGIST PROVIDED HISTORY: Reason for exam:->crohn's diasease, abscess Additional Contrast?->Oral FINDINGS: Lower Chest: Minimal subpleural scarring in the right lower lobe. Otherwise the lung bases. Heart is in size. Organs: Liver: Too small to characterize hypodensities liver likely represent cysts. Gallbladder: Unremarkable. Pancreas: Unremarkable. Spleen:  Unremarkable. Adrenals: Unremarkable. Kidneys: Small left renal cyst.  Otherwise, GI/Bowel: There is no significant interval change in the bowel inflammatory changes as of yesterday. Again noted is a large, irregularly marginated a multilocular abscess in the lower pelvis. The inflammatory collection measures approximately 9.2 x 7.2 cm in the maximal axial plane. An abscess measuring approximately 2.5 cm is seen along wall of the it has sigmoid colon. Gas associated with the abscesses may signify infection with gas producing organisms or related to bowel perforation. In addition to the sigmoid colon, there is mural thickening of the distal ileal bowel loop, which traverses the site of the abscess. There is mild fluid distention of the proximal small bowel loops to maximum caliber of approximately 4 cm. Liquid stool is seen in the proximal colon. Pelvis: The urinary bladder and prostate are grossly unremarkable. Peritoneum/Retroperitoneum: Again noted are enlarged mesenteric lymph nodes with ill-defined margins and surrounding fat stranding likely representing lymphadenitis. Bones/Soft Tissues: Sclerosis along the iliac aspect of the sacroiliac joints, associated with tiny cortical erosions may be related to enteropathic sacroiliitis, related to patient's history of Crohn's disease. 1. Compared to the CT of the abdomen and pelvis from yesterday, there is no significant interval change.  2. Again noted is a large, ill-defined, multilocular abscess in the lower abdominal and pelvic mesentery. An intramural abscess is seen in the wall of the sigmoid colon. 3. Mural thickening and inflammatory changes are also seen in the wall of the distal ileum, which traverses the site of the inflammation. There is mild distension of the proximal small bowel loops, which may be due to ileus or partial obstruction. Gaseous distention of the colon is also noted. 4. Grossly stable mesenteric lymphadenitis. 5. Findings of mild sacroiliitis, which may be enteropathic, related to patient's history of Crohn's disease. Ct Abdomen Pelvis W Iv Contrast Additional Contrast? None    Result Date: 10/21/2020  EXAMINATION: CT OF THE ABDOMEN AND PELVIS WITH CONTRAST 10/21/2020 10:06 am TECHNIQUE: CT of the abdomen and pelvis was performed with the administration of intravenous contrast. Multiplanar reformatted images are provided for review. Dose modulation, iterative reconstruction, and/or weight based adjustment of the mA/kV was utilized to reduce the radiation dose to as low as reasonably achievable. COMPARISON: None. HISTORY: ORDERING SYSTEM PROVIDED HISTORY: Lower abdominal pain TECHNOLOGIST PROVIDED HISTORY: Reason for exam:->Lower abdominal pain Additional Contrast?->None FINDINGS: Lower Chest: Minimal subpleural scarring in the right lower lobe. The lung bases otherwise clear. The heart is normal in size. Organs: Liver: Too small to definitively characterize hypodensities in the liver likely represent cysts. r the liver is mildly enlarged, measuring 20.8 cm in length. Gallbladder: Unremarkable. Pancreas: Unremarkable. Spleen:  Unremarkable. Adrenals: Unremarkable. Kidneys: Small cyst in the left kidney. Otherwise, unremarkable. He GI/Bowel: Severe inflammatory changes are seen in the sigmoid colon, with large extraluminal area of inflammation containing abscesses in free air. The extraluminal inflammatory collection measures approximately 9.8 x 7.8 cm in the maximum axial plane.   A 2.1 cm intramural abscess is seen along the right lateral wall of the sigmoid colon (series 2, image 152). There is mural thickening of a small bowel loop in the upper pelvis, which is likely due to secondary involvement with the extraluminal inflammatory changes. There is moderate gaseous and fecal distention of the remainder of the colon. The appendix is not visualized. Pelvis: The urinary bladder is grossly unremarkable. The prostate gland is unremarkable. Peritoneum/Retroperitoneum: Soft tissue densities with surrounding fat stranding in the mesentery likely represent enlarged and inflamed lymph nodes (lymphadenitis). The largest of these lymph nodes measures approximately 2.6 x 1.5 cm. Mild calcified atherosclerosis is seen in the aorta. No aneurysm. Bones/Soft Tissues: The visualized bones are intact without fracture or focal lesion. Sclerosis along the sacroiliac joints may represent sacroiliitis related to patient's history of Crohn's disease. 1. Severe inflammatory changes in the sigmoid colon, with evidence of intramural abscess, as well as a large extramural inflammatory collection (9.8 x 7.8 cm), containing multiple locules of abscesses and free air. The free air may be related to bowel perforation or infection with gas producing organisms. 2. Thickening of the small bowel loops in the region of the inflammatory changes likely represent secondary involvement of the bowel loop with the inflammation. 3. Enlarged ill-defined nodular densities in the mesentery likely represent enlarged lymph nodes. Surrounding fat stranding suggests lymphadenitis. 4. Mild hepatomegaly. 5. Too small to characterize hypodensities in the liver likely represent cysts. Discharge Exam:  General appearance: alert, awake, sitting up in bed, and cooperative  Eyes: conjunctiva pale, sclera anicteric. PERRL.   Lungs: clear to auscultation bilaterally  Heart: regular rate and rhythm, no murmur, 2+ pulses; no edema  Abdomen: soft, differential (CBC with dif)  2. Once weekly:  a. C-Reactive Protein (not high sensitivity CRP)  b. Erythrocyte/Westergren Sedimentation Rate (WSR or ESR)  c. Total CPK for patients on Daptomycin (Cubicin®). Obtain CPK more often if the patient is experiencing muscle weakness or myalgias. 3. Clinical Pharmacist is to adjust Vancomycin and Aminoglycosides unless otherwise indicated. a. Draw Vancomycin trough 30 minutes before the third dose  i. After starting drug, or   ii. After the dosing or interval is changed. 1. If the trough level is between 5 and 20 continue dose as ordered. b. Draw troughs twice weekly thereafter until troughs are stable (i.e. until trough is between 10 and 20 mcg/ml for two consecutive laboratory values). i. Once stable check troughs once weekly or every third dose. c. Please do not call physician unless the trough is < 5 or >20.  i. If the trough is <20 continue dosing as ordered. ii. If the trough is >20 call the office for further orders. Do not hold the dose while waiting for the trough result. 4. Amingoglycosides (e.g. Gentamicin, Tobramycin and Amikacin) peaks and troughs should be drawn twice weekly (preferably on Mondays and Thursdays) or every third dose. a. Aminoglycoside peaks are not to be drawn if patient on Once-Daily Dosing (ODD). b. Call physician or office if the trough is:  i. >1 for gentamicin,   ii. >2 for tobramycin, or   iii. >5 for amikacin  5. When clinically indicated obtain:  a. Urine culture. If the patient has a fever with purulent drainage from Tate or suprapubic catheter, or foul smelling urine. Do not irrigate a clogged Tate catheter. Replace it.  b. Blood cultures and Wound Gram stain with culture & sensitivity. If the patient has a fever or increasing drainage or foul odor from a wound. Notify the treating physician in a timely manner  c. Stool specimen. If diarrhea occurs while on antibiotics, send stools for C. difficile and WBCs.   6. When a [id number]  [phone]    Schedule an appointment as soon as possible for a visit in 2 days  for Crohn's flare    Radha Martinez MD  [address]    Schedule an appointment as soon as possible for a visit in 2 weeks  If symptoms worsen       Signed:  Electronically signed by Che Nichole MD on 10/27/20 at 11:28 AM EDT

## 2020-10-26 NOTE — PROGRESS NOTES
[facility]  JASONIDA  Progress Note    SUBJECTIVE:  Chief Complaint   Patient presents with    Other     was seen at [facility] told has an abscess on intestines and needed admitted     No new complaints today. Less abdominal pain. He does feel some gurgling. No nausea or vomiting. No fevers, chills or night sweats. Tolerating antibiotics. Review of systems:  As stated above in the chief complaint, otherwise negative. Medications:  Scheduled Meds:   dicyclomine  10 mg Oral 4x Daily    heparin flush  3 mL Intravenous 2 times per day    ampicillin-sulbactam  3 g Intravenous Q6H    sodium chloride flush  10 mL Intravenous BID    buprenorphine-naloxone  0.5 Film Sublingual BID    sodium chloride flush  10 mL Intravenous 2 times per day     Continuous Infusions:  PRN Meds:sodium chloride flush, heparin flush, sodium chloride flush, acetaminophen **OR** acetaminophen, promethazine **OR** ondansetron, magnesium sulfate, potassium chloride **OR** potassium alternative oral replacement **OR** potassium chloride    OBJECTIVE:  BP 98/61   Pulse 84   Temp 97.8 °F (36.6 °C) (Oral)   Resp 14   Ht 6' 2\" (1.88 m)   Wt 160 lb (72.6 kg)   SpO2 99%   BMI 20.54 kg/m²   Temp  Av.7 °F (37.1 °C)  Min: 97.8 °F (36.6 °C)  Max: 99.5 °F (37.5 °C)  Constitutional: The patient is sitting up in bed. No distress. Skin: Warm and dry. No rashes were noted. HEENT: Round and reactive pupils. Moist mucous membranes. No ulcerations or thrush. Neck: Supple to movements. Chest: Good breath sounds. No crackles. Cardiovascular: Heart sounds rhythmic and regular. Abdomen: Positive bowel sounds to auscultation. Nontender to palpation. Extremities: No clubbing, no cyanosis, no edema. Lines: Left PICC 10/25/2020.     Laboratory and Tests Review:  Lab Results   Component Value Date    WBC 6.2 10/26/2020    WBC 8.1 10/25/2020    WBC 5.9 10/24/2020    HGB 8.4 (L) 10/26/2020    HCT 27.5 (L) 10/26/2020    MCV 83.6 10/26/2020     (H) 10/26/2020     Lab Results   Component Value Date    NEUTROABS 4.07 10/26/2020    NEUTROABS 6.39 10/25/2020    NEUTROABS 4.31 10/24/2020     No results found for: [facility]  Lab Results   Component Value Date    ALT 6 10/26/2020    AST 8 10/26/2020    ALKPHOS 55 10/26/2020    BILITOT 0.4 10/26/2020     Lab Results   Component Value Date     10/26/2020    K 3.8 10/26/2020    K 4.4 10/22/2020    CL 99 10/26/2020    CO2 26 10/26/2020    BUN 9 10/26/2020    CREATININE 0.7 10/26/2020    CREATININE 0.7 10/25/2020    CREATININE 0.7 10/24/2020    GFRAA >60 10/26/2020    LABGLOM >60 10/26/2020    GLUCOSE 138 10/26/2020    PROT 5.7 10/26/2020    LABALBU 2.4 10/26/2020    CALCIUM 8.3 10/26/2020    BILITOT 0.4 10/26/2020    ALKPHOS 55 10/26/2020    AST 8 10/26/2020    ALT 6 10/26/2020     Lab Results   Component Value Date    CRP 17.6 (H) 10/25/2020    CRP 11.7 (H) 10/23/2020     Lab Results   Component Value Date    SEDRATE 41 (H) 10/25/2020    SEDRATE 57 (H) 10/23/2020     Radiology:  CT of the abdomen and pelvis reviewed    Microbiology:   Blood cultures were ordered but canceled    ASSESSMENT:  · Crohn's flareup  · Multiple intra-abdominal abscesses, improved clinically  · Small bowel fistula    PLAN:  · Continue empiric Unasyn for a minimum of 3 weeks. He will most likely require a repeat CT before stopping antibiotics  · Surgery has no plans for drainage  · The patient can be discharged from ID standpoint.   Instructed to call the office and make a follow-up appointment    Amanda Burden  10:47 AM  10/26/2020

## 2020-10-31 LAB — CALPROTECTIN, FECAL: 1640 UG/G

## 2020-11-20 ENCOUNTER — HOSPITAL ENCOUNTER (OUTPATIENT)
Dept: CT IMAGING | Age: 50
Discharge: HOME | End: 2020-11-22
Payer: COMMERCIAL

## 2020-11-20 ENCOUNTER — HOSPITAL ENCOUNTER (OUTPATIENT)
Age: 50
Discharge: HOME | End: 2020-11-20
Payer: COMMERCIAL

## 2020-11-20 PROCEDURE — 74177 CT ABD & PELVIS W/CONTRAST: CPT

## 2020-11-20 PROCEDURE — 6360000004 HC RX CONTRAST MEDICATION: Performed by: RADIOLOGY

## 2020-11-20 PROCEDURE — 2580000003 HC RX 258: Performed by: RADIOLOGY

## 2020-11-20 RX ORDER — SODIUM CHLORIDE 0.9 % (FLUSH) 0.9 %
10 SYRINGE (ML) INJECTION PRN
Status: DISCONTINUED | OUTPATIENT
Start: 2020-11-20 | End: 2020-11-23 | Stop reason: HOSPADM

## 2020-11-20 RX ADMIN — IOHEXOL 50 ML: 240 INJECTION, SOLUTION INTRATHECAL; INTRAVASCULAR; INTRAVENOUS; ORAL at 10:12

## 2020-11-20 RX ADMIN — Medication 10 ML: at 10:12

## 2020-11-20 RX ADMIN — IOPAMIDOL 90 ML: 755 INJECTION, SOLUTION INTRAVENOUS at 10:12

## 2020-11-24 ENCOUNTER — CLINICAL DOCUMENTATION (OUTPATIENT)
Dept: INFECTIOUS DISEASES | Age: 50
End: 2020-11-24

## 2020-11-24 NOTE — PROGRESS NOTES
Patient had repeat ct scan showing  suspected new 5.2 abscess to the left of the rectum. Dicussed with Dr Deni Hines continue antibiotics and see if IR can drain.

## 2020-11-30 DIAGNOSIS — Z01.818 PRE-OP EXAM: Primary | ICD-10-CM

## 2020-12-01 ENCOUNTER — HOSPITAL ENCOUNTER (OUTPATIENT)
Age: 50
Discharge: HOME | End: 2020-12-03
Payer: COMMERCIAL

## 2020-12-01 PROCEDURE — U0003 INFECTIOUS AGENT DETECTION BY NUCLEIC ACID (DNA OR RNA); SEVERE ACUTE RESPIRATORY SYNDROME CORONAVIRUS 2 (SARS-COV-2) (CORONAVIRUS DISEASE [COVID-19]), AMPLIFIED PROBE TECHNIQUE, MAKING USE OF HIGH THROUGHPUT TECHNOLOGIES AS DESCRIBED BY CMS-2020-01-R: HCPCS

## 2020-12-03 LAB
SARS-COV-2: NOT DETECTED
SOURCE: NORMAL

## 2020-12-07 ENCOUNTER — ANESTHESIA (OUTPATIENT)
Dept: CT IMAGING | Age: 50
End: 2020-12-07

## 2020-12-07 ENCOUNTER — HOSPITAL ENCOUNTER (OUTPATIENT)
Dept: INFUSION THERAPY | Age: 50
Setting detail: INFUSION SERIES
End: 2020-12-07
Payer: COMMERCIAL

## 2020-12-07 ENCOUNTER — ANESTHESIA EVENT (OUTPATIENT)
Dept: CT IMAGING | Age: 50
End: 2020-12-07

## 2020-12-07 ENCOUNTER — HOSPITAL ENCOUNTER (OUTPATIENT)
Dept: CT IMAGING | Age: 50
Discharge: HOME | End: 2020-12-09
Payer: COMMERCIAL

## 2020-12-07 VITALS
OXYGEN SATURATION: 100 % | SYSTOLIC BLOOD PRESSURE: 112 MMHG | DIASTOLIC BLOOD PRESSURE: 92 MMHG | RESPIRATION RATE: 18 BRPM

## 2020-12-07 VITALS
TEMPERATURE: 98.3 F | DIASTOLIC BLOOD PRESSURE: 76 MMHG | OXYGEN SATURATION: 97 % | SYSTOLIC BLOOD PRESSURE: 121 MMHG | RESPIRATION RATE: 20 BRPM | HEART RATE: 79 BPM

## 2020-12-07 PROCEDURE — 2580000003 HC RX 258: Performed by: ANESTHESIOLOGIST ASSISTANT

## 2020-12-07 PROCEDURE — 3700000001 HC ADD 15 MINUTES (ANESTHESIA)

## 2020-12-07 PROCEDURE — 75989 ABSCESS DRAINAGE UNDER X-RAY: CPT

## 2020-12-07 PROCEDURE — 75989 ABSCESS DRAINAGE UNDER X-RAY: CPT | Performed by: RADIOLOGY

## 2020-12-07 PROCEDURE — 6360000002 HC RX W HCPCS: Performed by: ANESTHESIOLOGIST ASSISTANT

## 2020-12-07 PROCEDURE — 7100000011 HC PHASE II RECOVERY - ADDTL 15 MIN

## 2020-12-07 PROCEDURE — 7100000010 HC PHASE II RECOVERY - FIRST 15 MIN

## 2020-12-07 PROCEDURE — 3700000000 HC ANESTHESIA ATTENDED CARE

## 2020-12-07 RX ORDER — PROPOFOL 10 MG/ML
INJECTION, EMULSION INTRAVENOUS PRN
Status: DISCONTINUED | OUTPATIENT
Start: 2020-12-07 | End: 2020-12-07 | Stop reason: SDUPTHER

## 2020-12-07 RX ORDER — MIDAZOLAM HYDROCHLORIDE 1 MG/ML
INJECTION INTRAMUSCULAR; INTRAVENOUS PRN
Status: DISCONTINUED | OUTPATIENT
Start: 2020-12-07 | End: 2020-12-07 | Stop reason: SDUPTHER

## 2020-12-07 RX ORDER — SODIUM CHLORIDE 9 MG/ML
INJECTION, SOLUTION INTRAVENOUS CONTINUOUS PRN
Status: DISCONTINUED | OUTPATIENT
Start: 2020-12-07 | End: 2020-12-07 | Stop reason: SDUPTHER

## 2020-12-07 RX ORDER — PROPOFOL 10 MG/ML
INJECTION, EMULSION INTRAVENOUS CONTINUOUS PRN
Status: DISCONTINUED | OUTPATIENT
Start: 2020-12-07 | End: 2020-12-07 | Stop reason: SDUPTHER

## 2020-12-07 RX ADMIN — MIDAZOLAM 2 MG: 1 INJECTION INTRAMUSCULAR; INTRAVENOUS at 09:33

## 2020-12-07 RX ADMIN — SODIUM CHLORIDE: 9 INJECTION, SOLUTION INTRAVENOUS at 09:21

## 2020-12-07 RX ADMIN — PROPOFOL 25 MG: 10 INJECTION, EMULSION INTRAVENOUS at 09:33

## 2020-12-07 RX ADMIN — PROPOFOL 50 MCG/KG/MIN: 10 INJECTION, EMULSION INTRAVENOUS at 09:33

## 2020-12-07 RX ADMIN — PROPOFOL 25 MG: 10 INJECTION, EMULSION INTRAVENOUS at 09:35

## 2020-12-07 ASSESSMENT — PAIN - FUNCTIONAL ASSESSMENT: PAIN_FUNCTIONAL_ASSESSMENT: 0-10

## 2020-12-07 ASSESSMENT — LIFESTYLE VARIABLES: SMOKING_STATUS: 1

## 2020-12-07 NOTE — ANESTHESIA POSTPROCEDURE EVALUATION
Department of Anesthesiology  Postprocedure Note    Patient: Claudene Kung  MRN: [id number]  YOB: 1970  Date of evaluation: 12/7/2020  Time:  12:00 PM     Procedure Summary     Date:  12/07/20 Room / Location:  [facility] CT Scan; [address] [facility] [address] [place] Procedures; [facility] Radiology    Anesthesia Start:  9072 Anesthesia Stop:  6537    Procedure:  CT GUIDED ABSCESS FLUID DRAIN Diagnosis:       Crohn's disease of rectum without complication ([address]      (Suspected new 5.2 abscess to the left of the rectum per recent ct scan)    Scheduled Providers:  [facility] General Radiologist Responsible Provider:  Tina Watkins MD    Anesthesia Type:  MAC ASA Status:  3          Anesthesia Type: No value filed. Cynthia Phase I:      Cynthia Phase II:      Last vitals: Reviewed and per EMR flowsheets.        Anesthesia Post Evaluation    Patient location during evaluation: PACU  Patient participation: complete - patient participated  Level of consciousness: awake and alert  Pain score: 0  Airway patency: patent  Nausea & Vomiting: no vomiting and no nausea  Complications: no  Cardiovascular status: hemodynamically stable  Respiratory status: spontaneous ventilation  Hydration status: stable

## 2020-12-07 NOTE — ANESTHESIA PRE PROCEDURE
Department of Anesthesiology  Preprocedure Note       Name:  Bob Vargas   Age:  52 y.o.  :  1970                                          MRN:  [id number]         Date:  2020      Surgeon: * No surgeons listed *    Procedure: * No procedures listed *    Medications prior to admission:   Prior to Admission medications    Medication Sig Start Date End Date Taking? Authorizing Provider   dicyclomine (BENTYL) 10 MG capsule Take 1 capsule by mouth 4 times daily 10/26/20   Paul Greene MD   buprenorphine-naloxone (SUBOXONE) 8-2 MG FILM SL film Place 1 Film under the tongue daily. Historical Provider, MD       Current medications:    Current Outpatient Medications   Medication Sig Dispense Refill    dicyclomine (BENTYL) 10 MG capsule Take 1 capsule by mouth 4 times daily 120 capsule 3    buprenorphine-naloxone (SUBOXONE) 8-2 MG FILM SL film Place 1 Film under the tongue daily. No current facility-administered medications for this encounter.       Facility-Administered Medications Ordered in Other Encounters   Medication Dose Route Frequency Provider Last Rate Last Dose    propofol injection    Continuous PRN [place] Horseman, AA 15 mL/hr at 20 0933 50 mcg/kg/min at 20 0933    propofol injection    PRN Sia Greshams, AA   25 mg at 20 0935    0.9 % sodium chloride infusion    Continuous PRN Bryn Horseman, AA        midazolam (VERSED) injection    PRN [place] Horseman, AA   2 mg at 20 0715       Allergies:  No Known Allergies    Problem List:    Patient Active Problem List   Diagnosis Code    Other pulmonary embolism and infarction I26.99    Intra-abdominal abscess ([facility] K65.1    Crohn's disease of both small and large intestine with fistula ([address] K50.813       Past Medical History:        Diagnosis Date    Crohn's disease ([address]     PE (pulmonary embolism)        Past Surgical History:        Procedure Laterality Date    KNEE SURGERY Right     meniscus    VASECTOMY  WISDOM TOOTH EXTRACTION         Social History:    Social History     Tobacco Use    Smoking status: Current Every Day Smoker     Packs/day: 1.00     Types: Cigarettes    Smokeless tobacco: Never Used   Substance Use Topics    Alcohol use: No                                Ready to quit: Not Answered  Counseling given: Not Answered      Vital Signs (Current):   Vitals:    12/07/20 0818   BP: 131/77   Pulse: 97   Resp: 16   Temp: 98.3 °F (36.8 °C)   TempSrc: Temporal   SpO2: 97%                                              BP Readings from Last 3 Encounters:   12/07/20 120/80   12/07/20 131/77   11/16/20 120/75       NPO Status:                                                                                 BMI:   Wt Readings from Last 3 Encounters:   11/16/20 150 lb (68 kg)   10/26/20 160 lb (72.6 kg)   10/21/20 148 lb (67.1 kg)     There is no height or weight on file to calculate BMI.    CBC:   Lab Results   Component Value Date    WBC 6.2 10/26/2020    RBC 3.29 10/26/2020    HGB 8.4 10/26/2020    HCT 27.5 10/26/2020    MCV 83.6 10/26/2020    RDW 18.1 10/26/2020     10/26/2020       CMP:   Lab Results   Component Value Date     10/26/2020    K 3.8 10/26/2020    K 4.4 10/22/2020    CL 99 10/26/2020    CO2 26 10/26/2020    BUN 9 10/26/2020    CREATININE 0.7 10/26/2020    GFRAA >60 10/26/2020    LABGLOM >60 10/26/2020    GLUCOSE 138 10/26/2020    PROT 5.7 10/26/2020    CALCIUM 8.3 10/26/2020    BILITOT 0.4 10/26/2020    ALKPHOS 55 10/26/2020    AST 8 10/26/2020    ALT 6 10/26/2020       POC Tests: No results for input(s): POCGLU, POCNA, POCK, POCCL, POCBUN, POCHEMO, POCHCT in the last 72 hours.     Coags:   Lab Results   Component Value Date    PROTIME 14.0 10/22/2020    INR 1.2 10/22/2020    APTT 27.5 01/27/2016       HCG (If Applicable): No results found for: PREGTESTUR, PREGSERUM, HCG, HCGQUANT     ABGs: No results found for: PHART, PO2ART, RSI3UCT, SVR2FIX, BEART, C3CWIDKM     Type & Screen

## 2020-12-07 NOTE — H&P
Interventional Radiology  Attending Pre-operative History and Physical    DIAGNOSIS:    Patient Active Problem List   Diagnosis    Other pulmonary embolism and infarction    Intra-abdominal abscess ([facility]    Crohn's disease of both small and large intestine with fistula ([facility]       CHIEF COMPLAINT: <principal problem not specified>          Current Outpatient Medications:     dicyclomine (BENTYL) 10 MG capsule, Take 1 capsule by mouth 4 times daily, Disp: 120 capsule, Rfl: 3    buprenorphine-naloxone (SUBOXONE) 8-2 MG FILM SL film, Place 1 Film under the tongue daily. , Disp: , Rfl:     No Known Allergies    Past Medical History:   Diagnosis Date    Crohn's disease ([facility]     PE (pulmonary embolism)        Past Surgical History:   Procedure Laterality Date    KNEE SURGERY Right     meniscus    VASECTOMY      WISDOM TOOTH EXTRACTION         History reviewed. No pertinent family history. Social History     Socioeconomic History    Marital status:       Spouse name: Not on file    Number of children: Not on file    Years of education: Not on file    Highest education level: Not on file   Occupational History    Not on file   Social Needs    Financial resource strain: Not on file    Food insecurity     Worry: Not on file     Inability: Not on file    Transportation needs     Medical: Not on file     Non-medical: Not on file   Tobacco Use    Smoking status: Current Every Day Smoker     Packs/day: 1.00     Types: Cigarettes    Smokeless tobacco: Never Used   Substance and Sexual Activity    Alcohol use: No    Drug use: Not Currently    Sexual activity: Not on file   Lifestyle    Physical activity     Days per week: Not on file     Minutes per session: Not on file    Stress: Not on file   Relationships    Social connections     Talks on phone: Not on file     Gets together: Not on file     Attends [employer] service: Not on file     Active member of club or organization: Not on file Attends meetings of clubs or organizations: Not on file     Relationship status: Not on file    Intimate partner violence     Fear of current or ex partner: Not on file     Emotionally abused: Not on file     Physically abused: Not on file     Forced sexual activity: Not on file   Other Topics Concern    Not on file   Social History Narrative    Not on file       ROS: Non-contributory other than as noted above    PHYSICAL EXAM:      Heart and Lungs:  demonstrate no contraindications to proceed    DATA:  CBC:   Lab Results   Component Value Date    WBC 6.2 10/26/2020    RBC 3.29 10/26/2020    HGB 8.4 10/26/2020    HCT 27.5 10/26/2020    MCV 83.6 10/26/2020    MCH 25.5 10/26/2020    MCHC 30.5 10/26/2020    RDW 18.1 10/26/2020     10/26/2020    MPV 8.9 10/26/2020     CBC with Differential:    Lab Results   Component Value Date    WBC 6.2 10/26/2020    RBC 3.29 10/26/2020    HGB 8.4 10/26/2020    HCT 27.5 10/26/2020     10/26/2020    MCV 83.6 10/26/2020    MCH 25.5 10/26/2020    MCHC 30.5 10/26/2020    RDW 18.1 10/26/2020    LYMPHOPCT 24.7 10/26/2020    MONOPCT 7.2 10/26/2020    BASOPCT 0.5 10/26/2020    MONOSABS 0.45 10/26/2020    LYMPHSABS 1.54 10/26/2020    EOSABS 0.11 10/26/2020    BASOSABS 0.03 10/26/2020     Platelets:    Lab Results   Component Value Date     10/26/2020     BUN/Creatinine:    Lab Results   Component Value Date    BUN 9 10/26/2020    CREATININE 0.7 10/26/2020       ASSESSMENT AND PLAN:  1. Abscess drainage  2. Procedure options, risks and benefits reviewed with patient. Patient expresses understanding.     Electronically signed by Karlos Fink II, MD on 12/7/2020 at 9:29 AM

## 2021-02-15 LAB
ALBUMIN SERPL-MCNC: 3.2 G/DL (ref 3.5–5.2)
ALP BLD-CCNC: 115 U/L (ref 40–129)
ALT SERPL-CCNC: 102 U/L (ref 0–40)
ANION GAP SERPL CALCULATED.3IONS-SCNC: 13 MMOL/L (ref 7–16)
AST SERPL-CCNC: 77 U/L (ref 0–39)
BILIRUB SERPL-MCNC: <0.2 MG/DL (ref 0–1.2)
BUN BLDV-MCNC: 15 MG/DL (ref 6–20)
CALCIUM SERPL-MCNC: 9.1 MG/DL (ref 8.6–10.2)
CHLORIDE BLD-SCNC: 100 MMOL/L (ref 98–107)
CO2: 22 MMOL/L (ref 22–29)
CREAT SERPL-MCNC: 0.8 MG/DL (ref 0.7–1.2)
GFR AFRICAN AMERICAN: >60
GFR NON-AFRICAN AMERICAN: >60 ML/MIN/1.73
GLUCOSE BLD-MCNC: 105 MG/DL (ref 74–99)
HCT VFR BLD CALC: 30 % (ref 37–54)
HEMOGLOBIN: 9.1 G/DL (ref 12.5–16.5)
MAGNESIUM: 2 MG/DL (ref 1.6–2.6)
MCH RBC QN AUTO: 25 PG (ref 26–35)
MCHC RBC AUTO-ENTMCNC: 30.3 % (ref 32–34.5)
MCV RBC AUTO: 82.4 FL (ref 80–99.9)
PDW BLD-RTO: 22 FL (ref 11.5–15)
PLATELET # BLD: 694 E9/L (ref 130–450)
PMV BLD AUTO: 9.8 FL (ref 7–12)
POTASSIUM SERPL-SCNC: 4.3 MMOL/L (ref 3.5–5)
RBC # BLD: 3.64 E12/L (ref 3.8–5.8)
SODIUM BLD-SCNC: 135 MMOL/L (ref 132–146)
TOTAL PROTEIN: 7.4 G/DL (ref 6.4–8.3)
WBC # BLD: 6.8 E9/L (ref 4.5–11.5)

## 2021-02-16 LAB — PHOSPHORUS: 4.5 MG/DL (ref 2.5–4.5)

## 2021-02-22 LAB
ALBUMIN SERPL-MCNC: 2.9 G/DL (ref 3.5–5.2)
ALP BLD-CCNC: 95 U/L (ref 40–129)
ALT SERPL-CCNC: 20 U/L (ref 0–40)
ANION GAP SERPL CALCULATED.3IONS-SCNC: 7 MMOL/L (ref 7–16)
AST SERPL-CCNC: 13 U/L (ref 0–39)
BILIRUB SERPL-MCNC: <0.2 MG/DL (ref 0–1.2)
BUN BLDV-MCNC: 18 MG/DL (ref 6–20)
CALCIUM SERPL-MCNC: 8.7 MG/DL (ref 8.6–10.2)
CHLORIDE BLD-SCNC: 97 MMOL/L (ref 98–107)
CO2: 26 MMOL/L (ref 22–29)
CREAT SERPL-MCNC: 0.8 MG/DL (ref 0.7–1.2)
GFR AFRICAN AMERICAN: >60
GFR NON-AFRICAN AMERICAN: >60 ML/MIN/1.73
GLUCOSE BLD-MCNC: 105 MG/DL (ref 74–99)
HCT VFR BLD CALC: 27.7 % (ref 37–54)
HEMOGLOBIN: 8.4 G/DL (ref 12.5–16.5)
MAGNESIUM: 2 MG/DL (ref 1.6–2.6)
MCH RBC QN AUTO: 24.9 PG (ref 26–35)
MCHC RBC AUTO-ENTMCNC: 30.3 % (ref 32–34.5)
MCV RBC AUTO: 82 FL (ref 80–99.9)
PDW BLD-RTO: 21.7 FL (ref 11.5–15)
PHOSPHORUS: 4.1 MG/DL (ref 2.5–4.5)
PLATELET # BLD: 758 E9/L (ref 130–450)
PMV BLD AUTO: 8.8 FL (ref 7–12)
POTASSIUM SERPL-SCNC: 4.5 MMOL/L (ref 3.5–5)
RBC # BLD: 3.38 E12/L (ref 3.8–5.8)
SODIUM BLD-SCNC: 130 MMOL/L (ref 132–146)
TOTAL PROTEIN: 6.8 G/DL (ref 6.4–8.3)
WBC # BLD: 7.4 E9/L (ref 4.5–11.5)

## 2021-03-01 LAB
ALBUMIN SERPL-MCNC: 3 G/DL (ref 3.5–5.2)
ALP BLD-CCNC: 78 U/L (ref 40–129)
ALT SERPL-CCNC: 9 U/L (ref 0–40)
ANION GAP SERPL CALCULATED.3IONS-SCNC: 9 MMOL/L (ref 7–16)
AST SERPL-CCNC: 11 U/L (ref 0–39)
BILIRUB SERPL-MCNC: <0.2 MG/DL (ref 0–1.2)
BUN BLDV-MCNC: 17 MG/DL (ref 6–20)
CALCIUM SERPL-MCNC: 8.4 MG/DL (ref 8.6–10.2)
CHLORIDE BLD-SCNC: 103 MMOL/L (ref 98–107)
CO2: 25 MMOL/L (ref 22–29)
CREAT SERPL-MCNC: 0.8 MG/DL (ref 0.7–1.2)
GFR AFRICAN AMERICAN: >60
GFR NON-AFRICAN AMERICAN: >60 ML/MIN/1.73
GLUCOSE BLD-MCNC: 123 MG/DL (ref 74–99)
HCT VFR BLD CALC: 27.3 % (ref 37–54)
HEMOGLOBIN: 8.2 G/DL (ref 12.5–16.5)
MAGNESIUM: 1.9 MG/DL (ref 1.6–2.6)
MCH RBC QN AUTO: 25 PG (ref 26–35)
MCHC RBC AUTO-ENTMCNC: 30 % (ref 32–34.5)
MCV RBC AUTO: 83.2 FL (ref 80–99.9)
PDW BLD-RTO: 21.2 FL (ref 11.5–15)
PHOSPHORUS: 4.3 MG/DL (ref 2.5–4.5)
PLATELET # BLD: 663 E9/L (ref 130–450)
PMV BLD AUTO: 9.2 FL (ref 7–12)
POTASSIUM SERPL-SCNC: 4.1 MMOL/L (ref 3.5–5)
RBC # BLD: 3.28 E12/L (ref 3.8–5.8)
SODIUM BLD-SCNC: 137 MMOL/L (ref 132–146)
TOTAL PROTEIN: 6.6 G/DL (ref 6.4–8.3)
WBC # BLD: 5.5 E9/L (ref 4.5–11.5)

## 2021-03-15 LAB
ALBUMIN SERPL-MCNC: 3.1 G/DL (ref 3.5–5.2)
ALP BLD-CCNC: 71 U/L (ref 40–129)
ALT SERPL-CCNC: 8 U/L (ref 0–40)
ANION GAP SERPL CALCULATED.3IONS-SCNC: 10 MMOL/L (ref 7–16)
AST SERPL-CCNC: 12 U/L (ref 0–39)
BILIRUB SERPL-MCNC: <0.2 MG/DL (ref 0–1.2)
BUN BLDV-MCNC: 15 MG/DL (ref 6–20)
CALCIUM SERPL-MCNC: 8.5 MG/DL (ref 8.6–10.2)
CHLORIDE BLD-SCNC: 104 MMOL/L (ref 98–107)
CO2: 25 MMOL/L (ref 22–29)
CREAT SERPL-MCNC: 1 MG/DL (ref 0.7–1.2)
GFR AFRICAN AMERICAN: >60
GFR NON-AFRICAN AMERICAN: >60 ML/MIN/1.73
GLUCOSE BLD-MCNC: 104 MG/DL (ref 74–99)
HCT VFR BLD CALC: 32.3 % (ref 37–54)
HEMOGLOBIN: 9.4 G/DL (ref 12.5–16.5)
MAGNESIUM: 2.1 MG/DL (ref 1.6–2.6)
MCH RBC QN AUTO: 24.5 PG (ref 26–35)
MCHC RBC AUTO-ENTMCNC: 29.1 % (ref 32–34.5)
MCV RBC AUTO: 84.3 FL (ref 80–99.9)
PDW BLD-RTO: 19.7 FL (ref 11.5–15)
PHOSPHORUS: 4 MG/DL (ref 2.5–4.5)
PLATELET # BLD: 676 E9/L (ref 130–450)
PMV BLD AUTO: 9.5 FL (ref 7–12)
POTASSIUM SERPL-SCNC: 4 MMOL/L (ref 3.5–5)
RBC # BLD: 3.83 E12/L (ref 3.8–5.8)
SODIUM BLD-SCNC: 139 MMOL/L (ref 132–146)
TOTAL PROTEIN: 7 G/DL (ref 6.4–8.3)
WBC # BLD: 7.3 E9/L (ref 4.5–11.5)

## 2021-03-22 LAB
ALBUMIN SERPL-MCNC: 3.3 G/DL (ref 3.5–5.2)
ALP BLD-CCNC: 70 U/L (ref 40–129)
ALT SERPL-CCNC: 8 U/L (ref 0–40)
ANION GAP SERPL CALCULATED.3IONS-SCNC: 13 MMOL/L (ref 7–16)
AST SERPL-CCNC: 13 U/L (ref 0–39)
BILIRUB SERPL-MCNC: <0.2 MG/DL (ref 0–1.2)
BUN BLDV-MCNC: 17 MG/DL (ref 6–20)
C-REACTIVE PROTEIN: 7.8 MG/DL (ref 0–0.4)
CALCIUM SERPL-MCNC: 8.6 MG/DL (ref 8.6–10.2)
CHLORIDE BLD-SCNC: 100 MMOL/L (ref 98–107)
CO2: 22 MMOL/L (ref 22–29)
CREAT SERPL-MCNC: 0.8 MG/DL (ref 0.7–1.2)
FERRITIN: 299 NG/ML
GFR AFRICAN AMERICAN: >60
GFR NON-AFRICAN AMERICAN: >60 ML/MIN/1.73
GLUCOSE BLD-MCNC: 91 MG/DL (ref 74–99)
HCT VFR BLD CALC: 30.5 % (ref 37–54)
HEMOGLOBIN: 9.1 G/DL (ref 12.5–16.5)
IRON SATURATION: 7 % (ref 20–55)
IRON: 17 MCG/DL (ref 59–158)
MCH RBC QN AUTO: 25 PG (ref 26–35)
MCHC RBC AUTO-ENTMCNC: 29.8 % (ref 32–34.5)
MCV RBC AUTO: 83.8 FL (ref 80–99.9)
PDW BLD-RTO: 18.7 FL (ref 11.5–15)
PLATELET # BLD: 612 E9/L (ref 130–450)
PMV BLD AUTO: 9.1 FL (ref 7–12)
POTASSIUM SERPL-SCNC: 4.2 MMOL/L (ref 3.5–5)
RBC # BLD: 3.64 E12/L (ref 3.8–5.8)
SODIUM BLD-SCNC: 135 MMOL/L (ref 132–146)
TOTAL IRON BINDING CAPACITY: 245 MCG/DL (ref 250–450)
TOTAL PROTEIN: 6.9 G/DL (ref 6.4–8.3)
TRANSFERRIN: 190 MG/DL (ref 200–360)
VITAMIN B-12: 222 PG/ML (ref 211–946)
VITAMIN D 25-HYDROXY: 13 NG/ML (ref 30–100)
WBC # BLD: 8.1 E9/L (ref 4.5–11.5)

## 2021-03-25 LAB
ALPHA-TOCOPHEROL: 7.8 MG/L (ref 5.5–18)
GAMMA-TOCOPHEROL: 1.5 MG/L (ref 0–6)
RETINYL PALMITATE: <0.02 MG/L (ref 0–0.1)
VITAMIN A LEVEL: 0.29 MG/L (ref 0.3–1.2)
VITAMIN A, INTERP: ABNORMAL
VITAMIN B6: 10.1 NMOL/L (ref 20–125)

## 2021-03-29 LAB
ALBUMIN SERPL-MCNC: 3.1 G/DL (ref 3.5–5.2)
ALP BLD-CCNC: 72 U/L (ref 40–129)
ALT SERPL-CCNC: 8 U/L (ref 0–40)
ANION GAP SERPL CALCULATED.3IONS-SCNC: 12 MMOL/L (ref 7–16)
AST SERPL-CCNC: 11 U/L (ref 0–39)
BILIRUB SERPL-MCNC: <0.2 MG/DL (ref 0–1.2)
BUN BLDV-MCNC: 18 MG/DL (ref 6–20)
CALCIUM SERPL-MCNC: 8.6 MG/DL (ref 8.6–10.2)
CHLORIDE BLD-SCNC: 100 MMOL/L (ref 98–107)
CO2: 24 MMOL/L (ref 22–29)
CREAT SERPL-MCNC: 1 MG/DL (ref 0.7–1.2)
GFR AFRICAN AMERICAN: >60
GFR NON-AFRICAN AMERICAN: >60 ML/MIN/1.73
GLUCOSE BLD-MCNC: 91 MG/DL (ref 74–99)
HCT VFR BLD CALC: 30.8 % (ref 37–54)
HEMOGLOBIN: 9.1 G/DL (ref 12.5–16.5)
MAGNESIUM: 2.2 MG/DL (ref 1.6–2.6)
MCH RBC QN AUTO: 24.2 PG (ref 26–35)
MCHC RBC AUTO-ENTMCNC: 29.5 % (ref 32–34.5)
MCV RBC AUTO: 81.9 FL (ref 80–99.9)
PDW BLD-RTO: 17.8 FL (ref 11.5–15)
PHOSPHORUS: 4.2 MG/DL (ref 2.5–4.5)
PLATELET # BLD: 562 E9/L (ref 130–450)
PMV BLD AUTO: 9.5 FL (ref 7–12)
POTASSIUM SERPL-SCNC: 3.8 MMOL/L (ref 3.5–5)
RBC # BLD: 3.76 E12/L (ref 3.8–5.8)
SODIUM BLD-SCNC: 136 MMOL/L (ref 132–146)
TOTAL PROTEIN: 7 G/DL (ref 6.4–8.3)
WBC # BLD: 6.9 E9/L (ref 4.5–11.5)

## 2021-04-12 LAB
ALBUMIN SERPL-MCNC: 3 G/DL (ref 3.5–5.2)
ALP BLD-CCNC: 75 U/L (ref 40–129)
ALT SERPL-CCNC: 9 U/L (ref 0–40)
ANION GAP SERPL CALCULATED.3IONS-SCNC: 9 MMOL/L (ref 7–16)
AST SERPL-CCNC: 12 U/L (ref 0–39)
BILIRUB SERPL-MCNC: <0.2 MG/DL (ref 0–1.2)
BUN BLDV-MCNC: 22 MG/DL (ref 6–20)
CALCIUM SERPL-MCNC: 9 MG/DL (ref 8.6–10.2)
CHLORIDE BLD-SCNC: 104 MMOL/L (ref 98–107)
CO2: 25 MMOL/L (ref 22–29)
CREAT SERPL-MCNC: 0.9 MG/DL (ref 0.7–1.2)
GFR AFRICAN AMERICAN: >60
GFR NON-AFRICAN AMERICAN: >60 ML/MIN/1.73
GLUCOSE BLD-MCNC: 119 MG/DL (ref 74–99)
HCT VFR BLD CALC: 32.4 % (ref 37–54)
HEMOGLOBIN: 9.4 G/DL (ref 12.5–16.5)
MAGNESIUM: 2.2 MG/DL (ref 1.6–2.6)
MCH RBC QN AUTO: 23.9 PG (ref 26–35)
MCHC RBC AUTO-ENTMCNC: 29 % (ref 32–34.5)
MCV RBC AUTO: 82.4 FL (ref 80–99.9)
PDW BLD-RTO: 17.2 FL (ref 11.5–15)
PHOSPHORUS: 4 MG/DL (ref 2.5–4.5)
PLATELET # BLD: 661 E9/L (ref 130–450)
PMV BLD AUTO: 9.5 FL (ref 7–12)
POTASSIUM SERPL-SCNC: 4.1 MMOL/L (ref 3.5–5)
RBC # BLD: 3.93 E12/L (ref 3.8–5.8)
SODIUM BLD-SCNC: 138 MMOL/L (ref 132–146)
TOTAL PROTEIN: 6.8 G/DL (ref 6.4–8.3)
WBC # BLD: 8.2 E9/L (ref 4.5–11.5)

## 2021-04-21 NOTE — PROGRESS NOTES
Joann Gonzalez with home care called and states that one of their OT's was at Mr. Rivas's home and he was sitting on his shower chair and started leaning to the left. Patient hit his head on the wall and lost consciousness. The OT said his eyes were open and then he took a deep breath and became alert again. They did call the squad but patient refused to be transported. Home care feels that the patient still needs to be evaluated but wanted to know if we could call and advise the patient to be checked out. Notified surgical resident Dr. Amor All that CT results are available in epic.     Electronically signed by Mehnaz Garcia RN on 10/22/2020 at 2:30 PM